# Patient Record
Sex: FEMALE | Race: WHITE | NOT HISPANIC OR LATINO | Employment: OTHER | URBAN - METROPOLITAN AREA
[De-identification: names, ages, dates, MRNs, and addresses within clinical notes are randomized per-mention and may not be internally consistent; named-entity substitution may affect disease eponyms.]

---

## 2020-12-09 ENCOUNTER — OFFICE VISIT (OUTPATIENT)
Dept: URGENT CARE | Facility: CLINIC | Age: 65
End: 2020-12-09
Payer: COMMERCIAL

## 2020-12-09 VITALS
OXYGEN SATURATION: 97 % | RESPIRATION RATE: 16 BRPM | TEMPERATURE: 98.4 F | HEART RATE: 84 BPM | HEIGHT: 63 IN | WEIGHT: 150 LBS | BODY MASS INDEX: 26.58 KG/M2

## 2020-12-09 DIAGNOSIS — Z20.822 EXPOSURE TO COVID-19 VIRUS: ICD-10-CM

## 2020-12-09 DIAGNOSIS — B34.9 VIRAL SYNDROME: Primary | ICD-10-CM

## 2020-12-09 PROCEDURE — 99203 OFFICE O/P NEW LOW 30 MIN: CPT | Performed by: PHYSICIAN ASSISTANT

## 2020-12-09 PROCEDURE — U0003 INFECTIOUS AGENT DETECTION BY NUCLEIC ACID (DNA OR RNA); SEVERE ACUTE RESPIRATORY SYNDROME CORONAVIRUS 2 (SARS-COV-2) (CORONAVIRUS DISEASE [COVID-19]), AMPLIFIED PROBE TECHNIQUE, MAKING USE OF HIGH THROUGHPUT TECHNOLOGIES AS DESCRIBED BY CMS-2020-01-R: HCPCS | Performed by: PHYSICIAN ASSISTANT

## 2020-12-09 PROCEDURE — S9083 URGENT CARE CENTER GLOBAL: HCPCS | Performed by: PHYSICIAN ASSISTANT

## 2020-12-09 RX ORDER — DICYCLOMINE HCL 20 MG
20 TABLET ORAL 2 TIMES DAILY
COMMUNITY
Start: 2020-10-26 | End: 2022-04-26

## 2020-12-09 RX ORDER — MELOXICAM 7.5 MG/1
7.5 TABLET ORAL
COMMUNITY
End: 2022-04-26

## 2020-12-09 RX ORDER — ASPIRIN 81 MG/1
81 TABLET ORAL
COMMUNITY
End: 2022-04-26

## 2020-12-09 RX ORDER — PANTOPRAZOLE SODIUM 40 MG/1
40 TABLET, DELAYED RELEASE ORAL DAILY
COMMUNITY
Start: 2020-12-03 | End: 2021-05-28 | Stop reason: SDUPTHER

## 2020-12-11 LAB — SARS-COV-2 RNA SPEC QL NAA+PROBE: NOT DETECTED

## 2021-02-03 ENCOUNTER — OFFICE VISIT (OUTPATIENT)
Dept: URGENT CARE | Facility: CLINIC | Age: 66
End: 2021-02-03
Payer: COMMERCIAL

## 2021-02-03 VITALS
TEMPERATURE: 97.5 F | OXYGEN SATURATION: 97 % | DIASTOLIC BLOOD PRESSURE: 62 MMHG | HEART RATE: 80 BPM | BODY MASS INDEX: 25.34 KG/M2 | HEIGHT: 63 IN | WEIGHT: 143 LBS | SYSTOLIC BLOOD PRESSURE: 124 MMHG | RESPIRATION RATE: 16 BRPM

## 2021-02-03 DIAGNOSIS — S61.111A LACERATION OF RIGHT THUMB WITHOUT FOREIGN BODY WITH DAMAGE TO NAIL, INITIAL ENCOUNTER: ICD-10-CM

## 2021-02-03 DIAGNOSIS — L03.011 PARONYCHIA OF RIGHT THUMB: Primary | ICD-10-CM

## 2021-02-03 PROCEDURE — S9083 URGENT CARE CENTER GLOBAL: HCPCS | Performed by: PHYSICIAN ASSISTANT

## 2021-02-03 PROCEDURE — 99213 OFFICE O/P EST LOW 20 MIN: CPT | Performed by: PHYSICIAN ASSISTANT

## 2021-02-03 RX ORDER — CEPHALEXIN 500 MG/1
500 CAPSULE ORAL EVERY 12 HOURS SCHEDULED
Qty: 14 CAPSULE | Refills: 0 | Status: SHIPPED | OUTPATIENT
Start: 2021-02-03 | End: 2021-02-10

## 2021-02-03 NOTE — PATIENT INSTRUCTIONS
Take antibiotic as prescribed  Complete full dose even if symptoms began to improve  Soak thumb and warm soapy water  Pat dry  Observe for signs of worsening infection including increased swelling, redness, discharge, red streaking up the extremity, fevers or chills  Make appointment with General surgery  Follow-up with family doctor in 3-5 days  Go to ER symptoms become severe  Paronychia   WHAT YOU NEED TO KNOW:   Paronychia is an infection of your nail fold caused by bacteria or a fungus  The nail fold is the skin around your nail  Paronychia may happen suddenly and last for 6 weeks or longer  You may have paronychia on more than 1 finger or toe  DISCHARGE INSTRUCTIONS:   Medicines:   · Td vaccine  is a booster shot used to help prevent tetanus and diphtheria  The Td booster may be given to adolescents and adults every 10 years or for certain wounds and injuries  · Antibiotics: This medicine will help fight or prevent an infection  It may be given as a pill, cream, or ointment  · Steroids: This medicine will help decrease inflammation  It may be given as a pill, cream, or ointment  · Antifungal medicine: This medicine helps kill fungus that may be causing your infection  It may be given as a cream or ointment  · NSAIDs:  These medicines decrease pain and swelling  NSAIDs are available without a doctor's order  Ask your healthcare provider which medicine is right for you  Ask how much to take and when to take it  Take as directed  NSAIDs can cause stomach bleeding and kidney problems if not taken correctly  · Take your medicine as directed  Contact your healthcare provider if you think your medicine is not helping or if you have side effects  Tell him of her if you are allergic to any medicine  Keep a list of the medicines, vitamins, and herbs you take  Include the amounts, and when and why you take them  Bring the list or the pill bottles to follow-up visits   Carry your medicine list with you in case of an emergency  Follow up with your healthcare provider as directed:  Write down your questions so you remember to ask them during your visits  Self-care:   · Soak your nail:  Soak your nail in a mixture of equal parts vinegar and water 3 or 4 times each day  This will help decrease inflammation  · Apply a warm compress:  Soak a washcloth in warm water and place it on your nail  This will help decrease inflammation  · Elevate:  Raise your nail above the level of your heart as often as you can  This will help decrease swelling and pain  Prop your nail on pillows or blankets to keep it elevated comfortably  · Use lotion:  Apply lotion after you wash your hands  This will prevent your skin from becoming too dry  Prevent paronychia:   · Avoid chemicals and allergens that may harm your skin and nails  This includes soaps, laundry detergents, and nail products  · Keep your nails clean and dry  Avoid soaking your nails in water  Use cotton-lined rubber gloves or wear 2 rubber gloves if you work with food or water  The gloves will help protect your nail folds  · Keep your nails short  Do not bite your nails, pick at your hangnails, suck your fingers, or wear fake nails  Bring your own nail tools when you go to the nail salon  Contact your healthcare provider if:   · Your nail becomes loose, deformed, or falls off  · You have a large abscess on your nail  · You have questions or concerns about your condition or care  Return to the emergency department if:   · You have severe nail pain  · The inflammation spreads to your hand or arm  © Copyright [address] Information is for End User's use only and may not be sold, redistributed or otherwise used for commercial purposes  All illustrations and images included in CareNotes® are the copyrighted property of A D A M , Inc  or [facility] Marcella Amin   The above information is an  only   It is not intended as medical advice for individual conditions or treatments  Talk to your doctor, nurse or pharmacist before following any medical regimen to see if it is safe and effective for you

## 2021-02-03 NOTE — PROGRESS NOTES
[facility]        NAME: Karlos Power is a 72 y o  female  : 1955    MRN: [id number]  DATE: February 3, 2021  TIME: 3:24 PM    Assessment and Plan   Paronychia of right thumb [H87 [id number]]  1  Paronychia of right thumb  cephalexin (KEFLEX) 500 mg capsule    Ambulatory referral to [facility]   2  Laceration of right thumb without foreign body with damage to nail, initial encounter  Ambulatory referral to General Surgery     Skin growth to right nail consistent with possible pyogenic granuloma  Will refer to general surgery for excision/ treatment  Patient Instructions    Take antibiotic as prescribed  Complete full dose even if symptoms began to improve  Soak thumb and warm soapy water  Pat dry  Observe for signs of worsening infection including increased swelling, redness, discharge, red streaking up the extremity, fevers or chills  Make appointment with General surgery  Follow up with PCP in 3-5 days  Proceed to  ER if symptoms worsen  Chief Complaint     Chief Complaint   Patient presents with    Thumb Injury     pt states 6 days ago she cut her right thumb right where the nail meets the skin  Now has a bump with puss  History of Present Illness         Patient is a 35-year-old female with no significant past medical history presents the office complaining of infection to right thumb  Patient states she actually cut her nail bed approximately 6 days ago  She reports the nail was partially split  Over last few days, she has been picking at the nail trying to take it off  She began with increasing pain, swelling, and purulent drainage  She also now has an overgrowth to the base of the nail bed  Denies fevers, chills, or streaking up the extremity  Review of Systems   Review of Systems   Skin: Positive for color change and wound           Current Medications       Current Outpatient Medications:     aspirin (ECOTRIN LOW STRENGTH) 81 mg EC tablet, Take 81 mg by mouth, Disp: , Rfl:     dicyclomine (BENTYL) 20 mg tablet, Take 20 mg by mouth 2 (two) times a day, Disp: , Rfl:     meloxicam (MOBIC) 7 5 mg tablet, Take 7 5 mg by mouth, Disp: , Rfl:     pantoprazole (PROTONIX) 40 mg tablet, , Disp: , Rfl:     cephalexin (KEFLEX) 500 mg capsule, Take 1 capsule (500 mg total) by mouth every 12 (twelve) hours for 7 days, Disp: 14 capsule, Rfl: 0    Current Allergies     Allergies as of 02/03/2021 - Reviewed 02/03/2021   Allergen Reaction Noted    Erythromycin GI Intolerance 10/26/2020            The following portions of the patient's history were reviewed and updated as appropriate: allergies, current medications, past family history, past medical history, past social history, past surgical history and problem list      Past Medical History:   Diagnosis Date    GERD (gastroesophageal reflux disease)        Past Surgical History:   Procedure Laterality Date    HYSTERECTOMY      TONSILLECTOMY         History reviewed  No pertinent family history  Medications have been verified  Objective   /62   Pulse 80   Temp 97 5 °F (36 4 °C)   Resp 16   Ht 5' 3" (1 6 m)   Wt 64 9 kg (143 lb)   SpO2 97%   BMI 25 33 kg/m²   No LMP recorded  Patient has had a hysterectomy  Physical Exam     Physical Exam  Vitals signs and nursing note reviewed  Constitutional:       Appearance: She is well-developed  HENT:      Head: Normocephalic and atraumatic  Right Ear: External ear normal       Left Ear: External ear normal       Nose: Nose normal    Eyes:      General: Lids are normal       Conjunctiva/sclera: Conjunctivae normal    Skin:     General: Skin is warm and dry  Capillary Refill: Capillary refill takes less than 2 seconds  Findings: Wound (  Right thumbParonychia with skin overgrowth consistent with possible pyogenic granuloma  purulent drainage  No streaking  Neurovascular intact ) present     Neurological:      Mental Status: She is alert               right thumb

## 2021-02-04 ENCOUNTER — TELEPHONE (OUTPATIENT)
Dept: OBGYN CLINIC | Facility: MEDICAL CENTER | Age: 66
End: 2021-02-04

## 2021-02-04 NOTE — TELEPHONE ENCOUNTER
#: [phone]      Patient called in requesting an appt for  pyogenic granuloma in the right thumb  She was seen in the urgent care  Patient wants to be seen in [facility]  Is this something Dr Yang would see?        Please advise,

## 2021-02-11 ENCOUNTER — TELEPHONE (OUTPATIENT)
Dept: OTHER | Facility: OTHER | Age: 66
End: 2021-02-11

## 2021-02-11 NOTE — TELEPHONE ENCOUNTER
C [x] 2/11/21 8:45 AM 15 Rusty Charles MD [[id number]] Rosales Ordoñez [[id number]] NP [     Please call back to reschedule

## 2021-02-16 ENCOUNTER — TELEPHONE (OUTPATIENT)
Dept: OBGYN CLINIC | Facility: OTHER | Age: 66
End: 2021-02-16

## 2021-02-16 NOTE — TELEPHONE ENCOUNTER
Patient called in with a couple questions  She is a New Patient to Dr Rosangela Tsang tomorrow 02/17    -She is wondering if there is an actual procedure tomorrow or just a consultation          C/b # [phone]

## 2021-02-16 NOTE — TELEPHONE ENCOUNTER
Attempted to contact patient back regarding tomorrows appointment  Call was forwarded to [facility] and the mail box was full so unable to leave message  Will try calling back later

## 2021-02-16 NOTE — TELEPHONE ENCOUNTER
If it is a pyogenic granuloma, we can perform a small procedure in the office if appropriate      Yvrose or Eligio Estrada, please reach out to her to confirm

## 2021-02-17 ENCOUNTER — OFFICE VISIT (OUTPATIENT)
Dept: OBGYN CLINIC | Facility: MEDICAL CENTER | Age: 66
End: 2021-02-17
Payer: COMMERCIAL

## 2021-02-17 VITALS
HEART RATE: 73 BPM | SYSTOLIC BLOOD PRESSURE: 138 MMHG | BODY MASS INDEX: 25.34 KG/M2 | HEIGHT: 63 IN | WEIGHT: 143 LBS | DIASTOLIC BLOOD PRESSURE: 77 MMHG

## 2021-02-17 DIAGNOSIS — S69.91XA INJURY TO FINGERNAIL OF RIGHT HAND, INITIAL ENCOUNTER: Primary | ICD-10-CM

## 2021-02-17 PROCEDURE — 99204 OFFICE O/P NEW MOD 45 MIN: CPT | Performed by: ORTHOPAEDIC SURGERY

## 2021-02-17 NOTE — PROGRESS NOTES
Chief Complaint     Thumb pain      History of Present Illness     Cielo Bills is a 72 y o  female who presents today for evaluation and treatment of right thumb pain  She states she cut this finger along the proximal nail fold with a knife while trying to get wax out of a candle 2-3 weeks ago  She clipped back the skin but then admits to picking at the area  She started to develop pain and and redness and was seen at urgent care  At that time, they diagnosed her with paronychia and they were concerned about a pyogenic granuloma developing overtop her proximal nail (pictures in patient's chart)  She was prescribed Keflex and states that she has finished taking this as instructed  States the mass has seemed to flatten out, but she still has occasional bleeding from this area  No purulent drainage  States this area is   Past Medical History:   Diagnosis Date    GERD (gastroesophageal reflux disease)        Past Surgical History:   Procedure Laterality Date    HYSTERECTOMY      TONSILLECTOMY         Allergies   Allergen Reactions    Erythromycin GI Intolerance       Current Outpatient Medications on File Prior to Visit   Medication Sig Dispense Refill    aspirin (ECOTRIN LOW STRENGTH) 81 mg EC tablet Take 81 mg by mouth      dicyclomine (BENTYL) 20 mg tablet Take 20 mg by mouth 2 (two) times a day      meloxicam (MOBIC) 7 5 mg tablet Take 7 5 mg by mouth      pantoprazole (PROTONIX) 40 mg tablet        No current facility-administered medications on file prior to visit  Social History     Tobacco Use    Smoking status: Never Smoker    Smokeless tobacco: Never Used   Substance Use Topics    Alcohol use: Not Currently    Drug use: Not Currently       History reviewed  No pertinent family history  Review of Systems     As stated in the HPI  All other systems were reviewed and are negative        Physical Exam     /77   Pulse 73   Ht 5' 3" (1 6 m)   Wt 64 9 kg (143 lb) BMI 25 33 kg/m²     GENERAL: This is a well-developed, well-nourished, age-appropriate patient in no acute distress  The patient is alert and oriented x3  Pleasant and cooperative  Eyes: Anicteric sclerae  Extraocular movements appear intact  HENT: Nares are patent with no drainage  Lungs: There is equal chest rise on inspection  Breathing is non-labored with no audible wheezing  Cardiovascular: No cyanosis  No upper extremity lymphadema  Skin: Skin is warm to touch  No obvious skin lesions or rashes other than described below  Neurologic: No ataxia  Psychiatric: Mood and affect are appropriate  Right Upper Extremity:  Right thumb mass on urgent care photos no longer present  Patient has circular loss of nail in the proximal 1/3 of the nail plate with underlying laceration with minimal bleeding  She has Mild erythema and edema along proximal nail fold  No drainage  EPL/FPL intact  Sensation intact to light touch both radial and ulnar sides of thumb  Brisk capillary refill  Data Review     Previous  Clinical images  from urgent care were reviewed and do show a mass located along the proximal nail that could be consistent with pyogenic granuloma  Assessment and Plan      Diagnoses and all orders for this visit:    Injury to fingernail of right hand, initial encounter       72year old female with right thumb nail fold injury and possible history of previous pyogenic granuloma  It was explained to the patient that at this time, no current evidence of a pyogenic granuloma present, but the mass that was there very well could have been this  I did explain that there is a chance of recurrence of this and to call if this occurs  Otherwise, it appears as though this is healing, but that she just has residual inflammation  I did explain this should continue to improve with time and that as her nail continues to grow out this deformity should correct itself with time   Can perform warm soapy water soaks daily and can cover with either Band-Aid or Coban wrap for protection  How to coban wrap shown to patient today  Patient should call if any changes with the finger prior to her follow up      Follow Up: 1 month    To Do Next Visit: Reevaluate wound    PROCEDURES PERFORMED:  Procedures  No Procedures performed today

## 2021-02-22 ENCOUNTER — APPOINTMENT (OUTPATIENT)
Dept: LAB | Facility: CLINIC | Age: 66
End: 2021-02-22
Payer: COMMERCIAL

## 2021-02-22 ENCOUNTER — OFFICE VISIT (OUTPATIENT)
Dept: FAMILY MEDICINE CLINIC | Facility: CLINIC | Age: 66
End: 2021-02-22
Payer: COMMERCIAL

## 2021-02-22 VITALS
BODY MASS INDEX: 25.45 KG/M2 | WEIGHT: 143.6 LBS | TEMPERATURE: 97.6 F | OXYGEN SATURATION: 98 % | SYSTOLIC BLOOD PRESSURE: 138 MMHG | HEART RATE: 74 BPM | DIASTOLIC BLOOD PRESSURE: 78 MMHG | HEIGHT: 63 IN

## 2021-02-22 DIAGNOSIS — N61.1 ABSCESS OF LEFT BREAST: ICD-10-CM

## 2021-02-22 DIAGNOSIS — L03.011 PARONYCHIA OF RIGHT THUMB: ICD-10-CM

## 2021-02-22 DIAGNOSIS — Z13.1 SCREENING FOR DIABETES MELLITUS: ICD-10-CM

## 2021-02-22 DIAGNOSIS — M54.2 NECK PAIN: ICD-10-CM

## 2021-02-22 DIAGNOSIS — R23.4 BREAST SKIN CHANGES: Primary | ICD-10-CM

## 2021-02-22 DIAGNOSIS — Z12.31 ENCOUNTER FOR SCREENING MAMMOGRAM FOR MALIGNANT NEOPLASM OF BREAST: ICD-10-CM

## 2021-02-22 DIAGNOSIS — R07.89 CHEST WALL TENDERNESS: ICD-10-CM

## 2021-02-22 DIAGNOSIS — D22.9 ATYPICAL NEVI: ICD-10-CM

## 2021-02-22 LAB
ALBUMIN SERPL BCP-MCNC: 4.2 G/DL (ref 3.5–5)
ALP SERPL-CCNC: 81 U/L (ref 46–116)
ALT SERPL W P-5'-P-CCNC: 26 U/L (ref 12–78)
ANION GAP SERPL CALCULATED.3IONS-SCNC: 3 MMOL/L (ref 4–13)
AST SERPL W P-5'-P-CCNC: 14 U/L (ref 5–45)
BILIRUB SERPL-MCNC: 0.3 MG/DL (ref 0.2–1)
BUN SERPL-MCNC: 16 MG/DL (ref 5–25)
CALCIUM SERPL-MCNC: 9.6 MG/DL (ref 8.3–10.1)
CHLORIDE SERPL-SCNC: 111 MMOL/L (ref 100–108)
CO2 SERPL-SCNC: 29 MMOL/L (ref 21–32)
CREAT SERPL-MCNC: 0.63 MG/DL (ref 0.6–1.3)
EST. AVERAGE GLUCOSE BLD GHB EST-MCNC: 108 MG/DL
GFR SERPL CREATININE-BSD FRML MDRD: 94 ML/MIN/1.73SQ M
GLUCOSE P FAST SERPL-MCNC: 96 MG/DL (ref 65–99)
HBA1C MFR BLD: 5.4 %
POTASSIUM SERPL-SCNC: 4.3 MMOL/L (ref 3.5–5.3)
PROT SERPL-MCNC: 7.2 G/DL (ref 6.4–8.2)
SODIUM SERPL-SCNC: 143 MMOL/L (ref 136–145)

## 2021-02-22 PROCEDURE — 83036 HEMOGLOBIN GLYCOSYLATED A1C: CPT

## 2021-02-22 PROCEDURE — 3725F SCREEN DEPRESSION PERFORMED: CPT | Performed by: NURSE PRACTITIONER

## 2021-02-22 PROCEDURE — 36415 COLL VENOUS BLD VENIPUNCTURE: CPT

## 2021-02-22 PROCEDURE — 3288F FALL RISK ASSESSMENT DOCD: CPT | Performed by: NURSE PRACTITIONER

## 2021-02-22 PROCEDURE — 80053 COMPREHEN METABOLIC PANEL: CPT

## 2021-02-22 PROCEDURE — 99203 OFFICE O/P NEW LOW 30 MIN: CPT | Performed by: NURSE PRACTITIONER

## 2021-02-22 PROCEDURE — 1101F PT FALLS ASSESS-DOCD LE1/YR: CPT | Performed by: NURSE PRACTITIONER

## 2021-02-22 RX ORDER — CEPHALEXIN 500 MG/1
500 CAPSULE ORAL EVERY 8 HOURS SCHEDULED
Qty: 21 CAPSULE | Refills: 0 | Status: SHIPPED | OUTPATIENT
Start: 2021-02-22 | End: 2021-03-01

## 2021-02-22 NOTE — PATIENT INSTRUCTIONS
You may receive a survey in the mail, or by e-mail, please fill it out COMPLETELY, and let us know how we did! Please remember to sign up for your [employer] fransisco to check you lab results, send us messages, and schedule appointments  If you have questions about the [employer] option, please call us! Thank you again for choosing [facility]!

## 2021-02-22 NOTE — PROGRESS NOTES
BMI Counseling: Body mass index is 25 44 kg/m²  The BMI is above normal  Nutrition recommendations include encouraging healthy choices of fruits and vegetables, moderation in carbohydrate intake and reducing intake of saturated and trans fat  Reports she has been dieting more often  Assessment/Plan:       Diagnoses and all orders for this visit:    Encounter for screening mammogram for malignant neoplasm of breast  -     cephalexin (KEFLEX) 500 mg capsule; Take 1 capsule (500 mg total) by mouth every 8 (eight) hours for 7 days  -     Mammo diagnostic bilateral w cad; Future    Breast skin changes  -     cephalexin (KEFLEX) 500 mg capsule; Take 1 capsule (500 mg total) by mouth every 8 (eight) hours for 7 days  -     Mammo diagnostic bilateral w cad; Future    Abscess of left breast  -     cephalexin (KEFLEX) 500 mg capsule; Take 1 capsule (500 mg total) by mouth every 8 (eight) hours for 7 days  -     Mammo diagnostic bilateral w cad; Future    Neck pain    Chest wall tenderness    Atypical nevi  -     cephalexin (KEFLEX) 500 mg capsule; Take 1 capsule (500 mg total) by mouth every 8 (eight) hours for 7 days  -     Mammo diagnostic bilateral w cad; Future    Screening for diabetes mellitus  -     HEMOGLOBIN A1C W/ EAG ESTIMATION; Future  -     Comprehensive metabolic panel; Future     The dark area on her right areola appears to be a mole but she notes it is new and the area under the left breasts does appear to be a sore which is also new  She is due for a mammogram so will have that completed  Also due to appearance of the area of concern of the left breast will begin a course of abx therapy  Suspect the neck and back pain to be muscular in nature - she would like a breasts reduction in the future and the seems plausible  Screening blood work also ordered  Subjective:      Patient ID: Ramirez Wong is a 72 y o  female      Here today as a new patient - reports recent areas of concern on her breasts  States of a small "black spot" on her right breast that is new and a red/sore area on the underside of her left breast   Notes that her breasts cause her many back issues and neck pain due to their larger size  She attempted a breast reduction in the past and was denied  Reports of some stomach issues in which she was told this past summer in [place] that she has a stomach condition but does not remember the name  Also states of some tenderness to her chest wall - notes it hurts when she presses on it  The following portions of the patient's history were reviewed and updated as appropriate: allergies, current medications, past family history, past medical history, past social history, past surgical history and problem list     Review of Systems   Constitutional: Negative  Respiratory: Negative  Cardiovascular: Negative  Skin:        Please see HPI  Neurological: Negative  Objective:      /78 (BP Location: Right arm, Patient Position: Sitting, Cuff Size: Large)   Pulse 74   Temp 97 6 °F (36 4 °C)   Ht 5' 3" (1 6 m)   Wt 65 1 kg (143 lb 9 6 oz)   SpO2 98%   BMI 25 44 kg/m²          Physical Exam  Constitutional:       Appearance: Normal appearance  Cardiovascular:      Rate and Rhythm: Normal rate and regular rhythm  Heart sounds: No murmur  No gallop  Pulmonary:      Effort: Pulmonary effort is normal  No respiratory distress  Breath sounds: Normal breath sounds  No wheezing or rales  Chest:       Neurological:      General: No focal deficit present  Mental Status: She is alert and oriented to person, place, and time  Mental status is at baseline  Psychiatric:         Mood and Affect: Mood normal          Behavior: Behavior normal          Thought Content:  Thought content normal          Judgment: Judgment normal

## 2021-02-24 ENCOUNTER — HOSPITAL ENCOUNTER (OUTPATIENT)
Dept: RADIOLOGY | Facility: HOSPITAL | Age: 66
Discharge: HOME | End: 2021-02-24
Attending: RADIOLOGY

## 2021-02-24 ENCOUNTER — HOSPITAL ENCOUNTER (OUTPATIENT)
Dept: RADIOLOGY | Facility: CLINIC | Age: 66
Discharge: HOME | End: 2021-02-24
Payer: COMMERCIAL

## 2021-02-24 ENCOUNTER — TELEPHONE (OUTPATIENT)
Dept: FAMILY MEDICINE CLINIC | Facility: CLINIC | Age: 66
End: 2021-02-24

## 2021-02-24 VITALS — BODY MASS INDEX: 25.34 KG/M2 | WEIGHT: 143 LBS | HEIGHT: 63 IN

## 2021-02-24 DIAGNOSIS — D22.9 ATYPICAL NEVI: ICD-10-CM

## 2021-02-24 DIAGNOSIS — R23.4 BREAST SKIN CHANGES: ICD-10-CM

## 2021-02-24 DIAGNOSIS — R23.4 CHANGE OF SKIN OF BREAST: Primary | ICD-10-CM

## 2021-02-24 DIAGNOSIS — R23.4 CHANGE OF SKIN OF BREAST: ICD-10-CM

## 2021-02-24 DIAGNOSIS — Z76.89 REFERRAL OF PATIENT WITHOUT EXAMINATION OR TREATMENT: ICD-10-CM

## 2021-02-24 DIAGNOSIS — N61.1 ABSCESS OF LEFT BREAST: ICD-10-CM

## 2021-02-24 DIAGNOSIS — N61.1: ICD-10-CM

## 2021-02-24 PROCEDURE — G0279 TOMOSYNTHESIS, MAMMO: HCPCS

## 2021-02-24 PROCEDURE — 77066 DX MAMMO INCL CAD BI: CPT

## 2021-02-24 PROCEDURE — 76642 ULTRASOUND BREAST LIMITED: CPT

## 2021-03-23 ENCOUNTER — OFFICE VISIT (OUTPATIENT)
Dept: FAMILY MEDICINE CLINIC | Facility: CLINIC | Age: 66
End: 2021-03-23
Payer: COMMERCIAL

## 2021-03-23 VITALS
HEIGHT: 63 IN | OXYGEN SATURATION: 98 % | TEMPERATURE: 97.8 F | WEIGHT: 144.4 LBS | HEART RATE: 53 BPM | SYSTOLIC BLOOD PRESSURE: 142 MMHG | BODY MASS INDEX: 25.59 KG/M2 | DIASTOLIC BLOOD PRESSURE: 90 MMHG

## 2021-03-23 DIAGNOSIS — R22.1 NECK SWELLING: ICD-10-CM

## 2021-03-23 DIAGNOSIS — H92.01 RIGHT EAR PAIN: ICD-10-CM

## 2021-03-23 DIAGNOSIS — K08.89 PAIN, DENTAL: Primary | ICD-10-CM

## 2021-03-23 DIAGNOSIS — R14.0 ABDOMINAL BLOATING: ICD-10-CM

## 2021-03-23 PROCEDURE — 1160F RVW MEDS BY RX/DR IN RCRD: CPT | Performed by: NURSE PRACTITIONER

## 2021-03-23 PROCEDURE — 1036F TOBACCO NON-USER: CPT | Performed by: NURSE PRACTITIONER

## 2021-03-23 PROCEDURE — 3008F BODY MASS INDEX DOCD: CPT | Performed by: NURSE PRACTITIONER

## 2021-03-23 PROCEDURE — 99213 OFFICE O/P EST LOW 20 MIN: CPT | Performed by: NURSE PRACTITIONER

## 2021-03-23 RX ORDER — AMOXICILLIN 500 MG/1
500 CAPSULE ORAL EVERY 12 HOURS SCHEDULED
Qty: 14 CAPSULE | Refills: 0 | Status: SHIPPED | OUTPATIENT
Start: 2021-03-23 | End: 2021-03-30

## 2021-03-23 NOTE — PATIENT INSTRUCTIONS
You may receive a survey in the mail, or by e-mail, please fill it out COMPLETELY, and let us know how we did! Please remember to sign up for your [employer] fr[employer]isco to check you lab results, send us messages, and schedule appointments  If you have questions about the [employer] option, please call us! Thank you again for choosing [facility]!

## 2021-03-23 NOTE — PROGRESS NOTES
Assessment/Plan:       Diagnoses and all orders for this visit:    Pain, dental  -     amoxicillin (AMOXIL) 500 mg capsule; Take 1 capsule (500 mg total) by mouth every 12 (twelve) hours for 7 days    Neck swelling  -     amoxicillin (AMOXIL) 500 mg capsule; Take 1 capsule (500 mg total) by mouth every 12 (twelve) hours for 7 days    Right ear pain  -     amoxicillin (AMOXIL) 500 mg capsule; Take 1 capsule (500 mg total) by mouth every 12 (twelve) hours for 7 days    Abdominal bloating  -     US abdomen complete; Future      antibiotic prescribed for right dental pain  After discussion, will have an abdominal US completed due to abdominal bloating  Subjective:      Patient ID: Cielo Bills is a 72 y o  female  Here today with complaint of right sided dental pain and some right sided neck swelling and right ear pain  Reports her dentist would like her to see her PCP for an antibiotic  Also notes of feeling more bloated in her abdomen recently and does not know why  Denies any constipation  Also feels that maybe her neck pain that she has may be related to her teeth  The following portions of the patient's history were reviewed and updated as appropriate: allergies, current medications, past family history, past medical history, past social history, past surgical history and problem list     Review of Systems   Constitutional: Negative  Respiratory: Negative  Cardiovascular: Negative  Neurological: Negative  All other systems reviewed and are negative  Objective:      /90 (BP Location: Right arm, Patient Position: Sitting, Cuff Size: Standard)   Pulse (!) 53   Temp 97 8 °F (36 6 °C)   Ht 5' 3" (1 6 m)   Wt 65 5 kg (144 lb 6 4 oz)   SpO2 98%   BMI 25 58 kg/m²          Physical Exam  Constitutional:       Appearance: Normal appearance     HENT:      Right Ear: Hearing, tympanic membrane, ear canal and external ear normal    Abdominal:      General: There is no distension  Neurological:      Mental Status: She is alert and oriented to person, place, and time

## 2021-03-25 ENCOUNTER — IMMUNIZATIONS (OUTPATIENT)
Dept: FAMILY MEDICINE CLINIC | Facility: HOSPITAL | Age: 66
End: 2021-03-25

## 2021-03-25 DIAGNOSIS — Z23 ENCOUNTER FOR IMMUNIZATION: Primary | ICD-10-CM

## 2021-03-25 PROCEDURE — 91301 SARS-COV-2 / COVID-19 MRNA VACCINE (MODERNA) 100 MCG: CPT

## 2021-03-25 PROCEDURE — 0011A SARS-COV-2 / COVID-19 MRNA VACCINE (MODERNA) 100 MCG: CPT

## 2021-03-26 ENCOUNTER — HOSPITAL ENCOUNTER (OUTPATIENT)
Dept: ULTRASOUND IMAGING | Facility: HOSPITAL | Age: 66
Discharge: HOME | End: 2021-03-26
Payer: COMMERCIAL

## 2021-03-26 DIAGNOSIS — R14.0 ABDOMINAL BLOATING: ICD-10-CM

## 2021-03-26 PROCEDURE — 76700 US EXAM ABDOM COMPLETE: CPT

## 2021-03-31 DIAGNOSIS — R14.0 ABDOMINAL BLOATING: Primary | ICD-10-CM

## 2021-04-01 ENCOUNTER — APPOINTMENT (OUTPATIENT)
Dept: LAB | Facility: CLINIC | Age: 66
End: 2021-04-01
Payer: COMMERCIAL

## 2021-04-01 DIAGNOSIS — R14.0 ABDOMINAL BLOATING: ICD-10-CM

## 2021-04-01 PROCEDURE — 82785 ASSAY OF IGE: CPT

## 2021-04-01 PROCEDURE — 86003 ALLG SPEC IGE CRUDE XTRC EA: CPT

## 2021-04-02 LAB
ALLERGEN COMMENT: NORMAL
ALMOND IGE QN: <0.1 KUA/I
CASHEW NUT IGE QN: <0.1 KUA/I
CODFISH IGE QN: <0.1 KUA/I
EGG WHITE IGE QN: <0.1 KUA/I
GLUTEN IGE QN: <0.1 KUA/I
HAZELNUT IGE QN: <0.1 KUA/L
MILK IGE QN: <0.1 KUA/I
PEANUT IGE QN: <0.1 KUA/I
SALMON IGE QN: <0.1 KUA/I
SCALLOP IGE QN: <0.1 KUA/L
SESAME SEED IGE QN: <0.1 KUA/I
SHRIMP IGE QN: <0.1 KUA/L
SOYBEAN IGE QN: <0.1 KUA/I
TOTAL IGE SMQN RAST: <2 KU/L (ref 0–113)
TUNA IGE QN: <0.1 KUA/I
WALNUT IGE QN: <0.1 KUA/I
WHEAT IGE QN: <0.1 KUA/I

## 2021-04-13 ENCOUNTER — TELEPHONE (OUTPATIENT)
Dept: FAMILY MEDICINE CLINIC | Facility: CLINIC | Age: 66
End: 2021-04-13

## 2021-04-13 NOTE — TELEPHONE ENCOUNTER
Patient called and stated she is still having stomach issues  She is going away in a couple weeks and when she had gone to [facility] in October and the doctor told her she had SIBO bacterial growth  Wants to know if there is any specific diet that she should be on, she does not want to get sick while on vacation      Please advise

## 2021-04-14 NOTE — TELEPHONE ENCOUNTER
Well, with that she should avoid food that can cause fermentation leading to possible bacteria  They recommend a diet low in FODMAPs which are types of food that can convert easier to fermentation  She should avoid excess sugar, lactose, soda/soft drinks  I would recommend she look up FODMAPs herself, as the list is quite long prior, prior to going on vacatin

## 2021-04-23 ENCOUNTER — IMMUNIZATIONS (OUTPATIENT)
Dept: FAMILY MEDICINE CLINIC | Facility: HOSPITAL | Age: 66
End: 2021-04-23

## 2021-04-23 DIAGNOSIS — Z23 ENCOUNTER FOR IMMUNIZATION: Primary | ICD-10-CM

## 2021-04-23 PROCEDURE — 91301 SARS-COV-2 / COVID-19 MRNA VACCINE (MODERNA) 100 MCG: CPT

## 2021-04-23 PROCEDURE — 0012A SARS-COV-2 / COVID-19 MRNA VACCINE (MODERNA) 100 MCG: CPT

## 2021-04-24 ENCOUNTER — APPOINTMENT (EMERGENCY)
Dept: RADIOLOGY | Facility: HOSPITAL | Age: 66
End: 2021-04-24
Payer: COMMERCIAL

## 2021-04-24 ENCOUNTER — APPOINTMENT (EMERGENCY)
Dept: CT IMAGING | Facility: HOSPITAL | Age: 66
End: 2021-04-24
Payer: COMMERCIAL

## 2021-04-24 ENCOUNTER — HOSPITAL ENCOUNTER (EMERGENCY)
Facility: HOSPITAL | Age: 66
Discharge: HOME | End: 2021-04-24
Attending: EMERGENCY MEDICINE | Admitting: EMERGENCY MEDICINE
Payer: COMMERCIAL

## 2021-04-24 VITALS
SYSTOLIC BLOOD PRESSURE: 108 MMHG | HEART RATE: 79 BPM | WEIGHT: 144 LBS | TEMPERATURE: 98.7 F | HEIGHT: 63 IN | BODY MASS INDEX: 25.52 KG/M2 | OXYGEN SATURATION: 98 % | DIASTOLIC BLOOD PRESSURE: 78 MMHG | RESPIRATION RATE: 18 BRPM
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
## 2021-04-24 DIAGNOSIS — S00.83XA CONTUSION OF FACE, INITIAL ENCOUNTER: ICD-10-CM

## 2021-04-24 DIAGNOSIS — M79.602 PAIN OF LEFT UPPER EXTREMITY: ICD-10-CM

## 2021-04-24 DIAGNOSIS — K04.7 DENTAL INFECTION: Primary | ICD-10-CM

## 2021-04-24 LAB
ALBUMIN SERPL BCP-MCNC: 3.8 G/DL (ref 3.5–5)
ALP SERPL-CCNC: 95 U/L (ref 46–116)
ALT SERPL W P-5'-P-CCNC: 34 U/L (ref 12–78)
AMPHETAMINES SERPL QL SCN: NEGATIVE
ANION GAP SERPL CALCULATED.3IONS-SCNC: 8 MMOL/L (ref 4–13)
AST SERPL W P-5'-P-CCNC: 29 U/L (ref 5–45)
BACTERIA UR QL AUTO: NORMAL /HPF
BARBITURATES UR QL: NEGATIVE
BASOPHILS # BLD AUTO: 0.03 THOUSANDS/ΜL (ref 0–0.1)
BASOPHILS NFR BLD AUTO: 1 % (ref 0–1)
BENZODIAZ UR QL: NEGATIVE
BILIRUB SERPL-MCNC: 0.38 MG/DL (ref 0.2–1)
BILIRUB UR QL STRIP: NEGATIVE
BUN SERPL-MCNC: 17 MG/DL (ref 5–25)
CALCIUM SERPL-MCNC: 8.1 MG/DL (ref 8.3–10.1)
CHLORIDE SERPL-SCNC: 103 MMOL/L (ref 100–108)
CLARITY UR: CLEAR
CO2 SERPL-SCNC: 27 MMOL/L (ref 21–32)
COCAINE UR QL: NEGATIVE
COLOR UR: YELLOW
CREAT SERPL-MCNC: 0.95 MG/DL (ref 0.6–1.3)
EOSINOPHIL # BLD AUTO: 0.13 THOUSAND/ΜL (ref 0–0.61)
EOSINOPHIL NFR BLD AUTO: 2 % (ref 0–6)
ERYTHROCYTE [DISTWIDTH] IN BLOOD BY AUTOMATED COUNT: 12.9 % (ref 11.6–15.1)
ETHANOL SERPL-MCNC: <3 MG/DL (ref 0–3)
GFR SERPL CREATININE-BSD FRML MDRD: 63 ML/MIN/1.73SQ M
GLUCOSE SERPL-MCNC: 118 MG/DL (ref 65–140)
GLUCOSE UR STRIP-MCNC: NEGATIVE MG/DL
HCT VFR BLD AUTO: 36.2 % (ref 34.8–46.1)
HGB BLD-MCNC: 12.4 G/DL (ref 11.5–15.4)
HGB UR QL STRIP.AUTO: ABNORMAL
IMM GRANULOCYTES # BLD AUTO: 0.01 THOUSAND/UL (ref 0–0.2)
IMM GRANULOCYTES NFR BLD AUTO: 0 % (ref 0–2)
KETONES UR STRIP-MCNC: NEGATIVE MG/DL
LACTATE SERPL-SCNC: 0.8 MMOL/L (ref 0.5–2)
LEUKOCYTE ESTERASE UR QL STRIP: NEGATIVE
LYMPHOCYTES # BLD AUTO: 0.99 THOUSANDS/ΜL (ref 0.6–4.47)
LYMPHOCYTES NFR BLD AUTO: 17 % (ref 14–44)
MCH RBC QN AUTO: 29.3 PG (ref 26.8–34.3)
MCHC RBC AUTO-ENTMCNC: 34.3 G/DL (ref 31.4–37.4)
MCV RBC AUTO: 86 FL (ref 82–98)
METHADONE UR QL: NEGATIVE
MONOCYTES # BLD AUTO: 0.45 THOUSAND/ΜL (ref 0.17–1.22)
MONOCYTES NFR BLD AUTO: 8 % (ref 4–12)
NEUTROPHILS # BLD AUTO: 4.36 THOUSANDS/ΜL (ref 1.85–7.62)
NEUTS SEG NFR BLD AUTO: 72 % (ref 43–75)
NITRITE UR QL STRIP: NEGATIVE
NON-SQ EPI CELLS URNS QL MICRO: NORMAL /HPF
NRBC BLD AUTO-RTO: 0 /100 WBCS
OPIATES UR QL SCN: POSITIVE
OXYCODONE+OXYMORPHONE UR QL SCN: NEGATIVE
PCP UR QL: NEGATIVE
PH UR STRIP.AUTO: 6 [PH]
PLATELET # BLD AUTO: 203 THOUSANDS/UL (ref 149–390)
PMV BLD AUTO: 10.3 FL (ref 8.9–12.7)
POTASSIUM SERPL-SCNC: 4.1 MMOL/L (ref 3.5–5.3)
PROT SERPL-MCNC: 6.8 G/DL (ref 6.4–8.2)
PROT UR STRIP-MCNC: NEGATIVE MG/DL
RBC # BLD AUTO: 4.23 MILLION/UL (ref 3.81–5.12)
RBC #/AREA URNS AUTO: NORMAL /HPF
SARS-COV-2 RNA RESP QL NAA+PROBE: NEGATIVE
SODIUM SERPL-SCNC: 138 MMOL/L (ref 136–145)
SP GR UR STRIP.AUTO: 1.01 (ref 1–1.03)
THC UR QL: NEGATIVE
TROPONIN I SERPL-MCNC: <0.02 NG/ML
UROBILINOGEN UR QL STRIP.AUTO: 0.2 E.U./DL
WBC # BLD AUTO: 5.97 THOUSAND/UL (ref 4.31–10.16)
WBC #/AREA URNS AUTO: NORMAL /HPF

## 2021-04-24 PROCEDURE — 70450 CT HEAD/BRAIN W/O DYE: CPT

## 2021-04-24 PROCEDURE — 83605 ASSAY OF LACTIC ACID: CPT | Performed by: PHYSICIAN ASSISTANT

## 2021-04-24 PROCEDURE — U0005 INFEC AGEN DETEC AMPLI PROBE: HCPCS | Performed by: PHYSICIAN ASSISTANT

## 2021-04-24 PROCEDURE — 36415 COLL VENOUS BLD VENIPUNCTURE: CPT | Performed by: PHYSICIAN ASSISTANT

## 2021-04-24 PROCEDURE — 80307 DRUG TEST PRSMV CHEM ANLYZR: CPT | Performed by: PHYSICIAN ASSISTANT

## 2021-04-24 PROCEDURE — 99285 EMERGENCY DEPT VISIT HI MDM: CPT | Performed by: EMERGENCY MEDICINE

## 2021-04-24 PROCEDURE — U0003 INFECTIOUS AGENT DETECTION BY NUCLEIC ACID (DNA OR RNA); SEVERE ACUTE RESPIRATORY SYNDROME CORONAVIRUS 2 (SARS-COV-2) (CORONAVIRUS DISEASE [COVID-19]), AMPLIFIED PROBE TECHNIQUE, MAKING USE OF HIGH THROUGHPUT TECHNOLOGIES AS DESCRIBED BY CMS-2020-01-R: HCPCS | Performed by: PHYSICIAN ASSISTANT

## 2021-04-24 PROCEDURE — 93005 ELECTROCARDIOGRAM TRACING: CPT

## 2021-04-24 PROCEDURE — 99285 EMERGENCY DEPT VISIT HI MDM: CPT

## 2021-04-24 PROCEDURE — 71045 X-RAY EXAM CHEST 1 VIEW: CPT

## 2021-04-24 PROCEDURE — 70486 CT MAXILLOFACIAL W/O DYE: CPT

## 2021-04-24 PROCEDURE — 72170 X-RAY EXAM OF PELVIS: CPT

## 2021-04-24 PROCEDURE — 80053 COMPREHEN METABOLIC PANEL: CPT | Performed by: PHYSICIAN ASSISTANT

## 2021-04-24 PROCEDURE — 73060 X-RAY EXAM OF HUMERUS: CPT

## 2021-04-24 PROCEDURE — 82077 ASSAY SPEC XCP UR&BREATH IA: CPT | Performed by: PHYSICIAN ASSISTANT

## 2021-04-24 PROCEDURE — 72125 CT NECK SPINE W/O DYE: CPT

## 2021-04-24 PROCEDURE — 96360 HYDRATION IV INFUSION INIT: CPT

## 2021-04-24 PROCEDURE — 84484 ASSAY OF TROPONIN QUANT: CPT | Performed by: PHYSICIAN ASSISTANT

## 2021-04-24 PROCEDURE — 73080 X-RAY EXAM OF ELBOW: CPT

## 2021-04-24 PROCEDURE — 85025 COMPLETE CBC W/AUTO DIFF WBC: CPT | Performed by: PHYSICIAN ASSISTANT

## 2021-04-24 RX ORDER — CLINDAMYCIN HYDROCHLORIDE 300 MG/1
300 CAPSULE ORAL 4 TIMES DAILY
Qty: 28 CAPSULE | Refills: 0 | Status: SHIPPED | OUTPATIENT
Start: 2021-04-24 | End: 2021-05-01

## 2021-04-24 RX ORDER — ACETAMINOPHEN 325 MG/1
650 TABLET ORAL ONCE
Status: COMPLETED | OUTPATIENT
Start: 2021-04-24 | End: 2021-04-24

## 2021-04-24 RX ORDER — CLINDAMYCIN HYDROCHLORIDE 150 MG/1
300 CAPSULE ORAL ONCE
Status: COMPLETED | OUTPATIENT
Start: 2021-04-24 | End: 2021-04-24

## 2021-04-24 RX ORDER — CLINDAMYCIN PHOSPHATE 600 MG/50ML
600 INJECTION INTRAVENOUS ONCE
Status: DISCONTINUED | OUTPATIENT
Start: 2021-04-24 | End: 2021-04-24

## 2021-04-24 RX ADMIN — SODIUM CHLORIDE 1000 ML: 0.9 INJECTION, SOLUTION INTRAVENOUS at 16:34

## 2021-04-24 RX ADMIN — ACETAMINOPHEN 650 MG: 325 TABLET ORAL at 16:41

## 2021-04-24 RX ADMIN — CLINDAMYCIN HYDROCHLORIDE 300 MG: 150 CAPSULE ORAL at 17:30

## 2021-04-24 NOTE — DISCHARGE INSTRUCTIONS
Please stop taking the amoxicillin and start taking the clindamycin as directed please follow-up with your dentist

## 2021-04-24 NOTE — ED ATTENDING ATTESTATION
4/24/2021  IKenna MD, saw and evaluated the patient  I have discussed the patient with the resident/non-physician practitioner and agree with the resident's/non-physician practitioner's findings, Plan of Care, and MDM as documented in the resident's/non-physician practitioner's note, except where noted  All available labs and Radiology studies were reviewed  I was present for key portions of any procedure(s) performed by the resident/non-physician practitioner and I was immediately available to provide assistance  At this point I agree with the current assessment done in the Emergency Department        ED Course         Critical Care Time  Procedures

## 2021-04-25 LAB
ATRIAL RATE: 82 BPM
P AXIS: 56 DEGREES
PR INTERVAL: 156 MS
QRS AXIS: 23 DEGREES
QRSD INTERVAL: 76 MS
QT INTERVAL: 346 MS
QTC INTERVAL: 404 MS
T WAVE AXIS: 38 DEGREES
VENTRICULAR RATE: 82 BPM

## 2021-05-21 ENCOUNTER — RA CDI HCC (OUTPATIENT)
Dept: OTHER | Facility: HOSPITAL | Age: 66
End: 2021-05-21

## 2021-05-28 ENCOUNTER — TRANSCRIBE ORDERS (OUTPATIENT)
Dept: ADMINISTRATIVE | Facility: HOSPITAL | Age: 66
End: 2021-05-28

## 2021-05-28 ENCOUNTER — APPOINTMENT (OUTPATIENT)
Dept: RADIOLOGY | Facility: CLINIC | Age: 66
End: 2021-05-28
Payer: COMMERCIAL

## 2021-05-28 ENCOUNTER — OFFICE VISIT (OUTPATIENT)
Dept: FAMILY MEDICINE CLINIC | Facility: CLINIC | Age: 66
End: 2021-05-28
Payer: COMMERCIAL

## 2021-05-28 VITALS
SYSTOLIC BLOOD PRESSURE: 130 MMHG | HEIGHT: 63 IN | OXYGEN SATURATION: 98 % | DIASTOLIC BLOOD PRESSURE: 78 MMHG | BODY MASS INDEX: 25.16 KG/M2 | TEMPERATURE: 98 F | HEART RATE: 63 BPM | WEIGHT: 142 LBS

## 2021-05-28 DIAGNOSIS — M79.604 PAIN IN BOTH LOWER EXTREMITIES: ICD-10-CM

## 2021-05-28 DIAGNOSIS — K21.9 GASTROESOPHAGEAL REFLUX DISEASE WITHOUT ESOPHAGITIS: ICD-10-CM

## 2021-05-28 DIAGNOSIS — M54.2 NECK PAIN, BILATERAL: ICD-10-CM

## 2021-05-28 DIAGNOSIS — R10.30 LOWER ABDOMINAL PAIN: ICD-10-CM

## 2021-05-28 DIAGNOSIS — M79.605 PAIN IN BOTH LOWER EXTREMITIES: ICD-10-CM

## 2021-05-28 DIAGNOSIS — Z00.00 WELCOME TO MEDICARE PREVENTIVE VISIT: Primary | ICD-10-CM

## 2021-05-28 DIAGNOSIS — R42 DIZZINESS: ICD-10-CM

## 2021-05-28 LAB
BACTERIA UR QL AUTO: NORMAL /HPF
BILIRUB UR QL STRIP: NEGATIVE
CLARITY UR: CLEAR
COLOR UR: YELLOW
GLUCOSE UR STRIP-MCNC: NEGATIVE MG/DL
HGB UR QL STRIP.AUTO: NEGATIVE
HYALINE CASTS #/AREA URNS LPF: NORMAL /LPF
KETONES UR STRIP-MCNC: NEGATIVE MG/DL
LEUKOCYTE ESTERASE UR QL STRIP: NEGATIVE
NITRITE UR QL STRIP: NEGATIVE
NON-SQ EPI CELLS URNS QL MICRO: NORMAL /HPF
PH UR STRIP.AUTO: 6.5 [PH]
PROT UR STRIP-MCNC: NEGATIVE MG/DL
RBC #/AREA URNS AUTO: NORMAL /HPF
SL AMB  POCT GLUCOSE, UA: ABNORMAL
SL AMB LEUKOCYTE ESTERASE,UA: ABNORMAL
SL AMB POCT BILIRUBIN,UA: ABNORMAL
SL AMB POCT BLOOD,UA: ABNORMAL
SL AMB POCT CLARITY,UA: CLEAR
SL AMB POCT COLOR,UA: YELLOW
SL AMB POCT KETONES,UA: ABNORMAL
SL AMB POCT NITRITE,UA: ABNORMAL
SL AMB POCT PH,UA: 5
SL AMB POCT SPECIFIC GRAVITY,UA: 1.01
SL AMB POCT URINE PROTEIN: ABNORMAL
SL AMB POCT UROBILINOGEN: ABNORMAL
SP GR UR STRIP.AUTO: 1.01 (ref 1–1.03)
UROBILINOGEN UR QL STRIP.AUTO: 0.2 E.U./DL
WBC #/AREA URNS AUTO: NORMAL /HPF

## 2021-05-28 PROCEDURE — 3288F FALL RISK ASSESSMENT DOCD: CPT | Performed by: NURSE PRACTITIONER

## 2021-05-28 PROCEDURE — 3725F SCREEN DEPRESSION PERFORMED: CPT | Performed by: NURSE PRACTITIONER

## 2021-05-28 PROCEDURE — G0438 PPPS, INITIAL VISIT: HCPCS | Performed by: NURSE PRACTITIONER

## 2021-05-28 PROCEDURE — 1160F RVW MEDS BY RX/DR IN RCRD: CPT | Performed by: NURSE PRACTITIONER

## 2021-05-28 PROCEDURE — 72050 X-RAY EXAM NECK SPINE 4/5VWS: CPT

## 2021-05-28 PROCEDURE — 93000 ELECTROCARDIOGRAM COMPLETE: CPT | Performed by: NURSE PRACTITIONER

## 2021-05-28 PROCEDURE — 81001 URINALYSIS AUTO W/SCOPE: CPT | Performed by: NURSE PRACTITIONER

## 2021-05-28 PROCEDURE — 87086 URINE CULTURE/COLONY COUNT: CPT | Performed by: NURSE PRACTITIONER

## 2021-05-28 PROCEDURE — 99213 OFFICE O/P EST LOW 20 MIN: CPT | Performed by: NURSE PRACTITIONER

## 2021-05-28 PROCEDURE — 1036F TOBACCO NON-USER: CPT | Performed by: NURSE PRACTITIONER

## 2021-05-28 PROCEDURE — 1125F AMNT PAIN NOTED PAIN PRSNT: CPT | Performed by: NURSE PRACTITIONER

## 2021-05-28 PROCEDURE — 81002 URINALYSIS NONAUTO W/O SCOPE: CPT | Performed by: NURSE PRACTITIONER

## 2021-05-28 PROCEDURE — 3008F BODY MASS INDEX DOCD: CPT | Performed by: NURSE PRACTITIONER

## 2021-05-28 PROCEDURE — 1170F FXNL STATUS ASSESSED: CPT | Performed by: NURSE PRACTITIONER

## 2021-05-28 RX ORDER — PANTOPRAZOLE SODIUM 40 MG/1
40 TABLET, DELAYED RELEASE ORAL DAILY
Qty: 90 TABLET | Refills: 0 | Status: SHIPPED | OUTPATIENT
Start: 2021-05-28 | End: 2021-08-16

## 2021-05-28 RX ORDER — NITROFURANTOIN 25; 75 MG/1; MG/1
100 CAPSULE ORAL 2 TIMES DAILY
Qty: 10 CAPSULE | Refills: 0 | Status: SHIPPED | OUTPATIENT
Start: 2021-05-28 | End: 2021-06-02

## 2021-05-28 NOTE — PATIENT INSTRUCTIONS
Medicare Preventive Visit Patient Instructions  Thank you for completing your Welcome to Medicare Visit or Medicare Annual Wellness Visit today  Your next wellness visit will be due in one year (5/29/2022)  The screening/preventive services that you may require over the next 5-10 years are detailed below  Some tests may not apply to you based off risk factors and/or age  Screening tests ordered at today's visit but not completed yet may show as past due  Also, please note that scanned in results may not display below  Preventive Screenings:  Service Recommendations Previous Testing/Comments   Colorectal Cancer Screening  * Colonoscopy    * Fecal Occult Blood Test (FOBT)/Fecal Immunochemical Test (FIT)  * Fecal DNA/Cologuard Test  * Flexible Sigmoidoscopy Age: 54-65 years old   Colonoscopy: every 10 years (may be performed more frequently if at higher risk)  OR  FOBT/FIT: every 1 year  OR  Cologuard: every 3 years  OR  Sigmoidoscopy: every 5 years  Screening may be recommended earlier than age 48 if at higher risk for colorectal cancer  Also, an individualized decision between you and your healthcare provider will decide whether screening between the ages of 74-80 would be appropriate  Colonoscopy: 03/12/2020  FOBT/FIT: Not on file  Cologuard: Not on file  Sigmoidoscopy: Not on file    Screening Current     Breast Cancer Screening Age: 36 years old  Frequency: every 1-2 years  Not required if history of left and right mastectomy Mammogram: 02/24/2021    Screening Current   Cervical Cancer Screening Between the ages of 21-29, pap smear recommended once every 3 years  Between the ages of 33-67, can perform pap smear with HPV co-testing every 5 years     Recommendations may differ for women with a history of total hysterectomy, cervical cancer, or abnormal pap smears in past  Pap Smear: Not on file    Screening Not Indicated   Hepatitis C Screening Once for adults born between 1945 and 1965  More frequently in patients at high risk for Hepatitis C Hep C Antibody: Not on file        Diabetes Screening 1-2 times per year if you're at risk for diabetes or have pre-diabetes Fasting glucose: 96 mg/dL   A1C: 5 4 %    Screening Current   Cholesterol Screening Once every 5 years if you don't have a lipid disorder  May order more often based on risk factors  Lipid panel: 06/24/2019    Screening Current     Other Preventive Screenings Covered by Medicare:  1  Abdominal Aortic Aneurysm (AAA) Screening: covered once if your at risk  You're considered to be at risk if you have a family history of AAA  2  Lung Cancer Screening: covers low dose CT scan once per year if you meet all of the following conditions: (1) Age 50-69; (2) No signs or symptoms of lung cancer; (3) Current smoker or have quit smoking within the last 15 years; (4) You have a tobacco smoking history of at least 30 pack years (packs per day multiplied by number of years you smoked); (5) You get a written order from a healthcare provider  3  Glaucoma Screening: covered annually if you're considered high risk: (1) You have diabetes OR (2) Family history of glaucoma OR (3)  aged 48 and older OR (3)  American aged 72 and older  3  Osteoporosis Screening: covered every 2 years if you meet one of the following conditions: (1) You're estrogen deficient and at risk for osteoporosis based off medical history and other findings; (2) Have a vertebral abnormality; (3) On glucocorticoid therapy for more than 3 months; (4) Have primary hyperparathyroidism; (5) On osteoporosis medications and need to assess response to drug therapy  · Last bone density test (DXA Scan): Not on file  5  HIV Screening: covered annually if you're between the age of 12-76  Also covered annually if you are younger than 13 and older than 72 with risk factors for HIV infection  For pregnant patients, it is covered up to 3 times per pregnancy      Immunizations:  Immunization Recommendations   Influenza Vaccine Annual influenza vaccination during flu season is recommended for all persons aged >= 6 months who do not have contraindications   Pneumococcal Vaccine (Prevnar and Pneumovax)  * Prevnar = PCV13  * Pneumovax = PPSV23   Adults 25-60 years old: 1-3 doses may be recommended based on certain risk factors  Adults 72 years old: Prevnar (PCV13) vaccine recommended followed by Pneumovax (PPSV23) vaccine  If already received PPSV23 since turning 65, then PCV13 recommended at least one year after PPSV23 dose  Hepatitis B Vaccine 3 dose series if at intermediate or high risk (ex: diabetes, end stage renal disease, liver disease)   Tetanus (Td) Vaccine - COST NOT COVERED BY MEDICARE PART B Following completion of primary series, a booster dose should be given every 10 years to maintain immunity against tetanus  Td may also be given as tetanus wound prophylaxis  Tdap Vaccine - COST NOT COVERED BY MEDICARE PART B Recommended at least once for all adults  For pregnant patients, recommended with each pregnancy  Shingles Vaccine (Shingrix) - COST NOT COVERED BY MEDICARE PART B  2 shot series recommended in those aged 48 and above     Health Maintenance Due:      Topic Date Due    Hepatitis C Screening  Never done    HIV Screening  Never done    MAMMOGRAM  02/24/2022    Colorectal Cancer Screening  03/12/2030     Immunizations Due:  There are no preventive care reminders to display for this patient  Advance Directives   What are advance directives? Advance directives are legal documents that state your wishes and plans for medical care  These plans are made ahead of time in case you lose your ability to make decisions for yourself  Advance directives can apply to any medical decision, such as the treatments you want, and if you want to donate organs  What are the types of advance directives? There are many types of advance directives, and each state has rules about how to use them  You may choose a combination of any of the following:  · Living will: This is a written record of the treatment you want  You can also choose which treatments you do not want, which to limit, and which to stop at a certain time  This includes surgery, medicine, IV fluid, and tube feedings  · Durable power of  for healthcare [facility]): This is a written record that states who you want to make healthcare choices for you when you are unable to make them for yourself  This person, called a proxy, is usually a family member or a friend  You may choose more than 1 proxy  · Do not resuscitate (DNR) order:  A DNR order is used in case your heart stops beating or you stop breathing  It is a request not to have certain forms of treatment, such as CPR  A DNR order may be included in other types of advance directives  · Medical directive: This covers the care that you want if you are in a coma, near death, or unable to make decisions for yourself  You can list the treatments you want for each condition  Treatment may include pain medicine, surgery, blood transfusions, dialysis, IV or tube feedings, and a ventilator (breathing machine)  · Values history: This document has questions about your views, beliefs, and how you feel and think about life  This information can help others choose the care that you would choose  Why are advance directives important? An advance directive helps you control your care  Although spoken wishes may be used, it is better to have your wishes written down  Spoken wishes can be misunderstood, or not followed  Treatments may be given even if you do not want them  An advance directive may make it easier for your family to make difficult choices about your care  Weight Management   Why it is important to manage your weight:  Being overweight increases your risk of health conditions such as heart disease, high blood pressure, type 2 diabetes, and certain types of cancer   It can also increase your risk for osteoarthritis, sleep apnea, and other respiratory problems  Aim for a slow, steady weight loss  Even a small amount of weight loss can lower your risk of health problems  How to lose weight safely:  A safe and healthy way to lose weight is to eat fewer calories and get regular exercise  You can lose up about 1 pound a week by decreasing the number of calories you eat by 500 calories each day  Healthy meal plan for weight management:  A healthy meal plan includes a variety of foods, contains fewer calories, and helps you stay healthy  A healthy meal plan includes the following:  · Eat whole-grain foods more often  A healthy meal plan should contain fiber  Fiber is the part of grains, fruits, and vegetables that is not broken down by your body  Whole-grain foods are healthy and provide extra fiber in your diet  Some examples of whole-grain foods are whole-wheat breads and pastas, oatmeal, brown rice, and bulgur  · Eat a variety of vegetables every day  Include dark, leafy greens such as spinach, kale, lis greens, and mustard greens  Eat yellow and orange vegetables such as carrots, sweet potatoes, and winter squash  · Eat a variety of fruits every day  Choose fresh or canned fruit (canned in its own juice or light syrup) instead of juice  Fruit juice has very little or no fiber  · Eat low-fat dairy foods  Drink fat-free (skim) milk or 1% milk  Eat fat-free yogurt and low-fat cottage cheese  Try low-fat cheeses such as mozzarella and other reduced-fat cheeses  · Choose meat and other protein foods that are low in fat  Choose beans or other legumes such as split peas or lentils  Choose fish, skinless poultry (chicken or turkey), or lean cuts of red meat (beef or pork)  Before you cook meat or poultry, cut off any visible fat  · Use less fat and oil  Try baking foods instead of frying them   Add less fat, such as margarine, sour cream, regular salad dressing and mayonnaise to foods  Eat fewer high-fat foods  Some examples of high-fat foods include french fries, doughnuts, ice cream, and cakes  · Eat fewer sweets  Limit foods and drinks that are high in sugar  This includes candy, cookies, regular soda, and sweetened drinks  Exercise:  Exercise at least 30 minutes per day on most days of the week  Some examples of exercise include walking, biking, dancing, and swimming  You can also fit in more physical activity by taking the stairs instead of the elevator or parking farther away from stores  Ask your healthcare provider about the best exercise plan for you  © Copyright [employer] 2018 Information is for End User's use only and may not be sold, redistributed or otherwise used for commercial purposes   All illustrations and images included in CareNotes® are the copyrighted property of A D A M , Inc  or [address] [place], [place] [address]

## 2021-05-28 NOTE — PROGRESS NOTES
Assessment/Plan:       Diagnoses and all orders for this visit:    Welcome to Medicare preventive visit  -     POCT ECG    Gastroesophageal reflux disease without esophagitis  -     pantoprazole (PROTONIX) 40 mg tablet; Take 1 tablet (40 mg total) by mouth daily    Neck pain, bilateral  -     XR spine cervical complete 4 or 5 vw non injury; Future    Lower abdominal pain  -     POCT urine dip  -     Urine culture; Future  -     Urinalysis with microscopic  -     nitrofurantoin (MACROBID) 100 mg capsule; Take 1 capsule (100 mg total) by mouth 2 (two) times a day for 5 days  -     Urine culture    Dizziness    Pain in both lower extremities    Other orders  -     Cancel: Hepatitis C Antibody ([facility]); Future  -     Cancel: HIV 1/2 Antigen/Antibody (4th Generation) w Reflex [facility]; Future      Will send urine for culture testing  Will start her on a five day course of Macrobid as she is traveling again next week  Refill provided for GERD  Will start with an XR of the cervical spine due to her ongoing neck pain  Discussed using a cushion for her leg pain while driving in the car for long hours  Subjective:      Patient ID: Shemar Miller is a 72 y o  female  Here today for an AWV with acute complaints  Reports she feels she is starting a UTI - has been traveling recently in which she drives far distances - notes she does not drink often and holds her urine too long  Reports of low abdominal pain and back pain  Also notes of some intermittent dizziness at times  Also reports some leg pain while sitting in her car and driving  Also notes she is still having the anterior neck pain that was attributed to possible dental issues but her tooth has since been pulled         The following portions of the patient's history were reviewed and updated as appropriate: allergies, current medications, past family history, past medical history, past social history, past surgical history and problem list     Review of Systems   Constitutional: Negative  Respiratory: Negative  Cardiovascular: Negative  Gastrointestinal: Positive for abdominal pain  Musculoskeletal: Positive for back pain and neck pain  Neurological: Positive for dizziness  Objective:      /78 (BP Location: Left arm, Patient Position: Sitting, Cuff Size: Standard)   Pulse 63   Temp 98 °F (36 7 °C)   Ht 5' 3" (1 6 m)   Wt 64 4 kg (142 lb)   SpO2 98%   BMI 25 15 kg/m²          Physical Exam  Constitutional:       Appearance: Normal appearance  HENT:      Head: Normocephalic and atraumatic  Cardiovascular:      Rate and Rhythm: Normal rate and regular rhythm  Heart sounds: No murmur  No gallop  Pulmonary:      Effort: Pulmonary effort is normal  No respiratory distress  Breath sounds: Normal breath sounds  No wheezing or rales  Abdominal:      General: Abdomen is flat  Palpations: Abdomen is soft  Skin:     General: Skin is warm and dry  Neurological:      General: No focal deficit present  Mental Status: She is alert and oriented to person, place, and time  Mental status is at baseline

## 2021-05-28 NOTE — PROGRESS NOTES
Yasmany Frias is here for her Welcome to Medicare visit  Last Medicare Wellness visit information reviewed, patient interviewed and updates made to the record today  Health Risk Assessment:   Patient rates overall health as good  Patient feels that their physical health rating is slightly worse  Patient is satisfied with their life  Eyesight was rated as same  Hearing was rated as same  Patient feels that their emotional and mental health rating is same  Patients states they are never, rarely angry  Patient states they are always unusually tired/fatigued  Pain experienced in the last 7 days has been a lot  Patient's pain rating has been 8/10  Patient states that she has experienced no weight loss or gain in last 6 months  Depression Screening:   PHQ-2 Score: 0      Fall Risk Screening: In the past year, patient has experienced: no history of falling in past year      Urinary Incontinence Screening:   Patient has not leaked urine accidently in the last six months  Home Safety:  Patient has trouble with stairs inside or outside of their home  Patient has working smoke alarms and has no working carbon monoxide detector  Home safety hazards include: household clutter and not having non-slip bath and/or shower mats  Nutrition:   Current diet is Low Carb  Medications:   Patient is not currently taking any over-the-counter supplements  Patient is able to manage medications       Activities of Daily Living (ADLs)/Instrumental Activities of Daily Living (IADLs):   Walk and transfer into and out of bed and chair?: Yes  Dress and groom yourself?: Yes    Bathe or shower yourself?: Yes    Do your laundry/housekeeping?: Yes  Manage your money, pay your bills and track your expenses?: Yes  Make your own meals?: Yes    Do your own shopping?: Yes    Previous Hospitalizations:   Any hospitalizations or ED visits within the last 12 months?: No      Advance Care Planning:   Living will: No    Durable POA for healthcare: No    Advanced directive: No    Five wishes given: Yes      Cognitive Screening:   Provider or family/friend/caregiver concerned regarding cognition?: No    PREVENTIVE SCREENINGS      Cardiovascular Screening:    General: Screening Current      Diabetes Screening:     General: Screening Current      Colorectal Cancer Screening:     General: Screening Current      Breast Cancer Screening:     General: Screening Current      Cervical Cancer Screening:    General: Screening Not Indicated      Lung Cancer Screening:     General: Screening Not Indicated    Screening, Brief Intervention, and Referral to Treatment (SBIRT)    Screening  Typical number of drinks in a day: 0  Typical number of drinks in a week: 0  Interpretation: Low risk drinking behavior  Single Item Drug Screening:  How often have you used an illegal drug (including marijuana) or a prescription medication for non-medical reasons in the past year? never    Single Item Drug Screen Score: 0  Interpretation: Negative screen for possible drug use disorder   Physical Exam  Constitutional:       Appearance: Normal appearance  HENT:      Head: Normocephalic and atraumatic  Cardiovascular:      Rate and Rhythm: Normal rate and regular rhythm  Heart sounds: No murmur  No gallop  Pulmonary:      Effort: Pulmonary effort is normal  No respiratory distress  Breath sounds: Normal breath sounds  No wheezing or rales  Abdominal:      General: Abdomen is flat  Palpations: Abdomen is soft  Skin:     General: Skin is warm and dry  Neurological:      General: No focal deficit present  Mental Status: She is alert and oriented to person, place, and time  Mental status is at baseline  Please see Additional note

## 2021-05-28 NOTE — PROGRESS NOTES
Assessment and Plan:     Problem List Items Addressed This Visit     None      Visit Diagnoses     Welcome to Medicare preventive visit    -  Primary    Relevant Orders    POCT ECG    Need for hepatitis C screening test        Screening for HIV (human immunodeficiency virus)               Preventive health issues were discussed with patient, and age appropriate screening tests were ordered as noted in patient's After Visit Summary  Personalized health advice and appropriate referrals for health education or preventive services given if needed, as noted in patient's After Visit Summary  History of Present Illness:     Patient presents for Welcome to Medicare visit       Patient Care Team:  ROSEANNE Molina as PCP - General (Family Medicine)     Review of Systems:     Review of Systems   Problem List:     Patient Active Problem List   Diagnosis    GERD (gastroesophageal reflux disease)    Arthritis      Past Medical and Surgical History:     Past Medical History:   Diagnosis Date    Arthritis     GERD (gastroesophageal reflux disease)      Past Surgical History:   Procedure Laterality Date    HYSTERECTOMY      partial hysterectomy age-43    TONSILLECTOMY        Family History:     Family History   Problem Relation Age of Onset    Diabetes Mother     Heart disease Father     No Known Problems Sister     No Known Problems Daughter     No Known Problems Maternal Grandmother     No Known Problems Maternal Grandfather     No Known Problems Paternal Grandmother     No Known Problems Paternal Grandfather     Ovarian cancer Daughter     No Known Problems Maternal Aunt     Ovarian cancer Maternal Aunt     Cancer Maternal Aunt     No Known Problems Maternal Aunt     No Known Problems Maternal Aunt     No Known Problems Maternal Aunt     No Known Problems Maternal Aunt     Breast cancer Paternal Aunt     No Known Problems Paternal Aunt       Social History:     E-Cigarette/Vaping    E-Cigarette Use Never User      E-Cigarette/Vaping Substances    Nicotine No     THC No     CBD No     Flavoring No     Other No     Unknown No      Social History     Socioeconomic History    Marital status:      Spouse name: None    Number of children: None    Years of education: None    Highest education level: None   Occupational History    None   Social Needs    Financial resource strain: None    Food insecurity     Worry: None     Inability: None    Transportation needs     Medical: None     Non-medical: None   Tobacco Use    Smoking status: Never Smoker    Smokeless tobacco: Never Used   Substance and Sexual Activity    Alcohol use: Not Currently    Drug use: Not Currently    Sexual activity: Not Currently   Lifestyle    Physical activity     Days per week: None     Minutes per session: None    Stress: None   Relationships    Social connections     Talks on phone: None     Gets together: None     Attends Druze service: None     Active member of club or organization: None     Attends meetings of clubs or organizations: None     Relationship status: None    Intimate partner violence     Fear of current or ex partner: None     Emotionally abused: None     Physically abused: None     Forced sexual activity: None   Other Topics Concern    None   Social History Narrative    None      Medications and Allergies:     Current Outpatient Medications   Medication Sig Dispense Refill    aspirin (ECOTRIN LOW STRENGTH) 81 mg EC tablet Take 81 mg by mouth      pantoprazole (PROTONIX) 40 mg tablet Take 40 mg by mouth daily       dicyclomine (BENTYL) 20 mg tablet Take 20 mg by mouth 2 (two) times a day      meloxicam (MOBIC) 7 5 mg tablet Take 7 5 mg by mouth       No current facility-administered medications for this visit        Allergies   Allergen Reactions    Erythromycin GI Intolerance      Immunizations:     Immunization History   Administered Date(s) Administered    H1N1, All Formulations 12/29/2009    SARS-CoV-2 / COVID-19 mRNA IM (Moderna) 03/25/2021, 04/23/2021    Tdap 04/17/2018      Health Maintenance:         Topic Date Due    Hepatitis C Screening  Never done    HIV Screening  Never done    MAMMOGRAM  02/24/2022    Colorectal Cancer Screening  03/12/2030     There are no preventive care reminders to display for this patient     Medicare Screening Tests and Risk Assessments:     Annual Wellness Visit  No exam data present     Physical Exam:     /78 (BP Location: Left arm, Patient Position: Sitting, Cuff Size: Standard)   Pulse 63   Temp 98 °F (36 7 °C)   Ht 5' 3" (1 6 m)   Wt 64 4 kg (142 lb)   SpO2 98%   BMI 25 15 kg/m²     Physical Exam     ROSEANNE Belcher

## 2021-05-29 LAB — BACTERIA UR CULT: NORMAL

## 2021-06-02 ENCOUNTER — TELEPHONE (OUTPATIENT)
Dept: FAMILY MEDICINE CLINIC | Facility: CLINIC | Age: 66
End: 2021-06-02

## 2021-06-02 DIAGNOSIS — R10.30 LOWER ABDOMINAL PAIN: Primary | ICD-10-CM

## 2021-06-02 DIAGNOSIS — R35.0 URINARY FREQUENCY: Primary | ICD-10-CM

## 2021-06-02 RX ORDER — OXYBUTYNIN CHLORIDE 5 MG/1
5 TABLET, EXTENDED RELEASE ORAL DAILY
Qty: 30 TABLET | Refills: 1 | Status: SHIPPED | OUTPATIENT
Start: 2021-06-02 | End: 2021-07-02

## 2021-06-02 NOTE — TELEPHONE ENCOUNTER
Patient called back  She agrees to GI and would like to see GI at [facility]    Can you please place a referral

## 2021-07-02 DIAGNOSIS — R35.0 URINARY FREQUENCY: ICD-10-CM

## 2021-07-02 RX ORDER — OXYBUTYNIN CHLORIDE 5 MG/1
TABLET, EXTENDED RELEASE ORAL
Qty: 30 TABLET | Refills: 1 | Status: SHIPPED | OUTPATIENT
Start: 2021-07-02 | End: 2021-08-04

## 2021-08-04 DIAGNOSIS — R35.0 URINARY FREQUENCY: ICD-10-CM

## 2021-08-04 RX ORDER — OXYBUTYNIN CHLORIDE 5 MG/1
TABLET, EXTENDED RELEASE ORAL
Qty: 30 TABLET | Refills: 1 | Status: SHIPPED | OUTPATIENT
Start: 2021-08-04 | End: 2021-09-05

## 2021-08-14 DIAGNOSIS — K21.9 GASTROESOPHAGEAL REFLUX DISEASE WITHOUT ESOPHAGITIS: ICD-10-CM

## 2021-08-16 RX ORDER — PANTOPRAZOLE SODIUM 40 MG/1
TABLET, DELAYED RELEASE ORAL
Qty: 90 TABLET | Refills: 0 | Status: SHIPPED | OUTPATIENT
Start: 2021-08-16 | End: 2021-11-16

## 2021-09-30 DIAGNOSIS — R35.0 URINARY FREQUENCY: ICD-10-CM

## 2021-09-30 RX ORDER — OXYBUTYNIN CHLORIDE 5 MG/1
TABLET, EXTENDED RELEASE ORAL
Qty: 30 TABLET | Refills: 1 | Status: SHIPPED | OUTPATIENT
Start: 2021-09-30 | End: 2021-10-14

## 2021-10-14 DIAGNOSIS — R35.0 URINARY FREQUENCY: ICD-10-CM

## 2021-10-14 RX ORDER — OXYBUTYNIN CHLORIDE 5 MG/1
TABLET, EXTENDED RELEASE ORAL
Qty: 90 TABLET | Refills: 1 | Status: SHIPPED | OUTPATIENT
Start: 2021-10-14 | End: 2022-04-26

## 2021-11-16 DIAGNOSIS — K21.9 GASTROESOPHAGEAL REFLUX DISEASE WITHOUT ESOPHAGITIS: ICD-10-CM

## 2021-11-16 RX ORDER — PANTOPRAZOLE SODIUM 40 MG/1
TABLET, DELAYED RELEASE ORAL
Qty: 90 TABLET | Refills: 0 | Status: SHIPPED | OUTPATIENT
Start: 2021-11-16 | End: 2022-03-16

## 2022-03-16 DIAGNOSIS — K21.9 GASTROESOPHAGEAL REFLUX DISEASE WITHOUT ESOPHAGITIS: ICD-10-CM

## 2022-03-16 RX ORDER — PANTOPRAZOLE SODIUM 40 MG/1
TABLET, DELAYED RELEASE ORAL
Qty: 90 TABLET | Refills: 0 | Status: SHIPPED | OUTPATIENT
Start: 2022-03-16 | End: 2022-06-21

## 2022-04-22 ENCOUNTER — TELEPHONE (OUTPATIENT)
Dept: ADMINISTRATIVE | Facility: OTHER | Age: 67
End: 2022-04-22

## 2022-04-22 NOTE — TELEPHONE ENCOUNTER
Upon review of the In Basket request we were able to locate, review, and update the patient chart as requested for CRC: Colonoscopy  Any additional questions or concerns should be emailed to the Practice Liaisons via [email or web address] email, please do not reply via In Basket      Thank you  Pricilla Ingram

## 2022-04-22 NOTE — TELEPHONE ENCOUNTER
----- Message from Austin Valenzuela sent at 4/21/2022  2:00 PM EDT -----  Regarding: care gap request  04/21/22 2:00 PM    Hello, our patient attached above has had CRC: Colonoscopy completed/performed  Please assist in updating the patient chart by pulling the Care Everywhere (CE) document  The date of service is 3/12/2020       Thank you,  Austin Valenzuela [facility]

## 2022-04-26 ENCOUNTER — OFFICE VISIT (OUTPATIENT)
Dept: FAMILY MEDICINE CLINIC | Facility: CLINIC | Age: 67
End: 2022-04-26
Payer: COMMERCIAL

## 2022-04-26 VITALS
OXYGEN SATURATION: 96 % | TEMPERATURE: 97.8 F | BODY MASS INDEX: 25.69 KG/M2 | HEART RATE: 70 BPM | DIASTOLIC BLOOD PRESSURE: 68 MMHG | HEIGHT: 63 IN | SYSTOLIC BLOOD PRESSURE: 122 MMHG | WEIGHT: 145 LBS

## 2022-04-26 DIAGNOSIS — Z78.0 POSTMENOPAUSE: ICD-10-CM

## 2022-04-26 DIAGNOSIS — R42 DIZZINESS: ICD-10-CM

## 2022-04-26 DIAGNOSIS — R07.89 CHEST PRESSURE: Primary | ICD-10-CM

## 2022-04-26 DIAGNOSIS — Z12.31 ENCOUNTER FOR SCREENING MAMMOGRAM FOR BREAST CANCER: ICD-10-CM

## 2022-04-26 DIAGNOSIS — Z11.59 NEED FOR HEPATITIS C SCREENING TEST: ICD-10-CM

## 2022-04-26 DIAGNOSIS — R06.01 ORTHOPNEA: ICD-10-CM

## 2022-04-26 PROCEDURE — 3008F BODY MASS INDEX DOCD: CPT | Performed by: INTERNAL MEDICINE

## 2022-04-26 PROCEDURE — 1160F RVW MEDS BY RX/DR IN RCRD: CPT | Performed by: INTERNAL MEDICINE

## 2022-04-26 PROCEDURE — 3725F SCREEN DEPRESSION PERFORMED: CPT | Performed by: INTERNAL MEDICINE

## 2022-04-26 PROCEDURE — 1101F PT FALLS ASSESS-DOCD LE1/YR: CPT | Performed by: INTERNAL MEDICINE

## 2022-04-26 PROCEDURE — 93000 ELECTROCARDIOGRAM COMPLETE: CPT | Performed by: INTERNAL MEDICINE

## 2022-04-26 PROCEDURE — 99214 OFFICE O/P EST MOD 30 MIN: CPT | Performed by: INTERNAL MEDICINE

## 2022-04-26 PROCEDURE — 1036F TOBACCO NON-USER: CPT | Performed by: INTERNAL MEDICINE

## 2022-04-26 PROCEDURE — 3288F FALL RISK ASSESSMENT DOCD: CPT | Performed by: INTERNAL MEDICINE

## 2022-04-26 NOTE — ASSESSMENT & PLAN NOTE
Her EKG is unremarkable today  Her differential diagnosis includes congestive heart failure and coronary ischemia  I doubt that she had an acute MI  She does not appear to be in heart failure now and it is unusual that she had the single episode and really has not had any persistent symptoms  We will check an echocardiogram   Consider stress testing

## 2022-04-26 NOTE — PROGRESS NOTES
Assessment/Plan:    Chest pressure  Her EKG is unremarkable today  Her differential diagnosis includes congestive heart failure and coronary ischemia  I doubt that she had an acute MI  She does not appear to be in heart failure now and it is unusual that she had the single episode and really has not had any persistent symptoms  We will check an echocardiogram   Consider stress testing  Dizziness  Unclear etiology  Will check labs  Consider Holter monitor  Chief Complaint     Follow-up; Care Gap Request; Tingling; Nail Problem          Patient ID: Juni Delarosa is a 77 y o  female who returns for evaluation of chest discomfort and shortness of breath  She had been seeing Ravi bautista but wants to switch to my practice  She woke up in the middle of the night with a feeling that "someone was stepping on my chest " She had to sit up to catch her breath  It took about 15 minutes for her to get back to normal before she could lay back down  She was able to go back to sleep  Since then, she hasn't had any further episodes of chest discomfort or shortness of breath  She also reports some light headedness with going up the stairs  No syncope  Dizziness has been ongoing for the last year, intermittently  She also has been reporting some tingling in her feet and numbness in her left hand and that her nails appear to be coiling upward  We agreed that we would focus our assessments today on her chest discomfort, shortness of breath, and dizziness  Objective:    /68 (BP Location: Right arm, Patient Position: Sitting, Cuff Size: Large)   Pulse 70   Temp 97 8 °F (36 6 °C)   Ht 5' 3" (1 6 m)   Wt 65 8 kg (145 lb)   SpO2 96%   BMI 25 69 kg/m²     Wt Readings from Last 3 Encounters:   04/26/22 65 8 kg (145 lb)   05/28/21 64 4 kg (142 lb)   04/24/21 65 3 kg (144 lb)         Physical Exam  Constitutional:       General: She is not in acute distress  Appearance: She is well-developed  Neck:      Vascular: No JVD  Cardiovascular:      Rate and Rhythm: Normal rate and regular rhythm  Heart sounds: Normal heart sounds  No murmur heard  No friction rub  No gallop  Pulmonary:      Breath sounds: Normal breath sounds  Abdominal:      General: Bowel sounds are normal  There is no distension  Palpations: Abdomen is soft  There is no mass  EKG:  Normal sinus rhythm at 60 beats per minute  There is a T-wave inversion in V1 which was noted on previous EKG  There is early transition of the R waves  No evidence of ischemia or infarction

## 2022-05-02 ENCOUNTER — APPOINTMENT (OUTPATIENT)
Dept: LAB | Facility: CLINIC | Age: 67
End: 2022-05-02
Payer: COMMERCIAL

## 2022-05-02 DIAGNOSIS — R42 DIZZINESS: ICD-10-CM

## 2022-05-02 LAB
ANION GAP SERPL CALCULATED.3IONS-SCNC: 1 MMOL/L (ref 4–13)
BUN SERPL-MCNC: 14 MG/DL (ref 5–25)
CALCIUM SERPL-MCNC: 9.1 MG/DL (ref 8.3–10.1)
CHLORIDE SERPL-SCNC: 111 MMOL/L (ref 100–108)
CO2 SERPL-SCNC: 29 MMOL/L (ref 21–32)
CREAT SERPL-MCNC: 0.74 MG/DL (ref 0.6–1.3)
ERYTHROCYTE [DISTWIDTH] IN BLOOD BY AUTOMATED COUNT: 13.2 % (ref 11.6–15.1)
GFR SERPL CREATININE-BSD FRML MDRD: 84 ML/MIN/1.73SQ M
GLUCOSE P FAST SERPL-MCNC: 95 MG/DL (ref 65–99)
HCT VFR BLD AUTO: 41.6 % (ref 34.8–46.1)
HGB BLD-MCNC: 13.3 G/DL (ref 11.5–15.4)
MCH RBC QN AUTO: 28.3 PG (ref 26.8–34.3)
MCHC RBC AUTO-ENTMCNC: 32 G/DL (ref 31.4–37.4)
MCV RBC AUTO: 89 FL (ref 82–98)
PLATELET # BLD AUTO: 191 THOUSANDS/UL (ref 149–390)
PMV BLD AUTO: 10.9 FL (ref 8.9–12.7)
POTASSIUM SERPL-SCNC: 4.4 MMOL/L (ref 3.5–5.3)
RBC # BLD AUTO: 4.7 MILLION/UL (ref 3.81–5.12)
SODIUM SERPL-SCNC: 141 MMOL/L (ref 136–145)
WBC # BLD AUTO: 4.62 THOUSAND/UL (ref 4.31–10.16)

## 2022-05-02 PROCEDURE — 85027 COMPLETE CBC AUTOMATED: CPT

## 2022-05-02 PROCEDURE — 80048 BASIC METABOLIC PNL TOTAL CA: CPT

## 2022-05-02 PROCEDURE — 36415 COLL VENOUS BLD VENIPUNCTURE: CPT

## 2022-05-03 ENCOUNTER — HOSPITAL ENCOUNTER (OUTPATIENT)
Dept: RADIOLOGY | Facility: CLINIC | Age: 67
Discharge: HOME | End: 2022-05-03
Payer: COMMERCIAL

## 2022-05-03 VITALS — HEIGHT: 62 IN | WEIGHT: 146.2 LBS | BODY MASS INDEX: 26.91 KG/M2

## 2022-05-03 DIAGNOSIS — Z78.0 POSTMENOPAUSE: ICD-10-CM

## 2022-05-03 DIAGNOSIS — Z12.31 ENCOUNTER FOR SCREENING MAMMOGRAM FOR BREAST CANCER: ICD-10-CM

## 2022-05-03 PROCEDURE — 77063 BREAST TOMOSYNTHESIS BI: CPT

## 2022-05-03 PROCEDURE — 77067 SCR MAMMO BI INCL CAD: CPT

## 2022-05-03 PROCEDURE — 77080 DXA BONE DENSITY AXIAL: CPT

## 2022-05-16 ENCOUNTER — APPOINTMENT (OUTPATIENT)
Dept: LAB | Facility: CLINIC | Age: 67
End: 2022-05-16
Payer: COMMERCIAL

## 2022-05-16 DIAGNOSIS — M81.0 AGE-RELATED OSTEOPOROSIS WITHOUT CURRENT PATHOLOGICAL FRACTURE: ICD-10-CM

## 2022-05-16 LAB
25(OH)D3 SERPL-MCNC: 23.1 NG/ML (ref 30–100)
ALBUMIN SERPL BCP-MCNC: 4 G/DL (ref 3.5–5)
ALP SERPL-CCNC: 71 U/L (ref 46–116)
ALT SERPL W P-5'-P-CCNC: 25 U/L (ref 12–78)
AST SERPL W P-5'-P-CCNC: 13 U/L (ref 5–45)
BILIRUB DIRECT SERPL-MCNC: 0.12 MG/DL (ref 0–0.2)
BILIRUB SERPL-MCNC: 0.39 MG/DL (ref 0.2–1)
PROT SERPL-MCNC: 6.6 G/DL (ref 6.4–8.2)

## 2022-05-16 PROCEDURE — 36415 COLL VENOUS BLD VENIPUNCTURE: CPT

## 2022-05-16 PROCEDURE — 82306 VITAMIN D 25 HYDROXY: CPT

## 2022-05-16 PROCEDURE — 80076 HEPATIC FUNCTION PANEL: CPT

## 2022-05-19 ENCOUNTER — HOSPITAL ENCOUNTER (OUTPATIENT)
Dept: NON INVASIVE DIAGNOSTICS | Facility: HOSPITAL | Age: 67
Discharge: HOME | End: 2022-05-19
Attending: INTERNAL MEDICINE
Payer: COMMERCIAL

## 2022-05-19 VITALS
DIASTOLIC BLOOD PRESSURE: 68 MMHG | WEIGHT: 146 LBS | BODY MASS INDEX: 27.56 KG/M2 | HEIGHT: 61 IN | SYSTOLIC BLOOD PRESSURE: 122 MMHG | HEART RATE: 68 BPM

## 2022-05-19 DIAGNOSIS — R06.01 ORTHOPNEA: ICD-10-CM

## 2022-05-19 DIAGNOSIS — R07.89 CHEST PRESSURE: ICD-10-CM

## 2022-05-19 LAB
AORTIC VALVE ANNULUS: 2.6 CM
APICAL FOUR CHAMBER EJECTION FRACTION: 65 %
ASCENDING AORTA: 2.5 CM
E WAVE DECELERATION TIME: 206 MS
FRACTIONAL SHORTENING: 34 (ref 28–44)
INTERVENTRICULAR SEPTUM IN DIASTOLE (PARASTERNAL SHORT AXIS VIEW): 0.8 CM
INTERVENTRICULAR SEPTUM: 0.8 CM (ref 0.6–1.1)
LAAS-AP4: 14 CM2
LEFT ATRIUM SIZE: 3.2 CM
LEFT INTERNAL DIMENSION IN SYSTOLE: 2.9 CM (ref 2.1–4)
LEFT VENTRICULAR INTERNAL DIMENSION IN DIASTOLE: 4.4 CM (ref 3.5–6)
LEFT VENTRICULAR POSTERIOR WALL IN END DIASTOLE: 0.7 CM
LEFT VENTRICULAR STROKE VOLUME: 55 ML
LVSV (TEICH): 55 ML
MV E'TISSUE VEL-LAT: 8 CM/S
MV E'TISSUE VEL-SEP: 7 CM/S
MV PEAK A VEL: 1.08 M/S
MV PEAK E VEL: 76 CM/S
RIGHT ATRIAL 2D VOLUME: 28 ML
RIGHT ATRIUM AREA SYSTOLE A4C: 12.4 CM2
RIGHT VENTRICLE ID DIMENSION: 2.6 CM
SL CV PED ECHO LEFT VENTRICLE DIASTOLIC VOLUME (MOD BIPLANE) 2D: 87 ML
SL CV PED ECHO LEFT VENTRICLE SYSTOLIC VOLUME (MOD BIPLANE) 2D: 32 ML
TR MAX PG: 21 MMHG
TR PEAK VELOCITY: 2.2 M/S
TRICUSPID ANNULAR PLANE SYSTOLIC EXCURSION: 1.6 CM
TRICUSPID VALVE PEAK REGURGITATION VELOCITY: 2.23 M/S

## 2022-05-19 PROCEDURE — 93306 TTE W/DOPPLER COMPLETE: CPT

## 2022-05-31 ENCOUNTER — OFFICE VISIT (OUTPATIENT)
Dept: FAMILY MEDICINE CLINIC | Facility: CLINIC | Age: 67
End: 2022-05-31
Payer: COMMERCIAL

## 2022-05-31 VITALS
WEIGHT: 147 LBS | OXYGEN SATURATION: 98 % | HEART RATE: 77 BPM | BODY MASS INDEX: 27.75 KG/M2 | TEMPERATURE: 98 F | HEIGHT: 61 IN | DIASTOLIC BLOOD PRESSURE: 74 MMHG | SYSTOLIC BLOOD PRESSURE: 118 MMHG

## 2022-05-31 DIAGNOSIS — E55.9 VITAMIN D DEFICIENCY: ICD-10-CM

## 2022-05-31 DIAGNOSIS — M54.41 CHRONIC RIGHT-SIDED LOW BACK PAIN WITH RIGHT-SIDED SCIATICA: Primary | ICD-10-CM

## 2022-05-31 DIAGNOSIS — G89.29 CHRONIC RIGHT-SIDED LOW BACK PAIN WITH RIGHT-SIDED SCIATICA: Primary | ICD-10-CM

## 2022-05-31 DIAGNOSIS — G47.09 OTHER INSOMNIA: ICD-10-CM

## 2022-05-31 DIAGNOSIS — R07.89 CHEST PRESSURE: ICD-10-CM

## 2022-05-31 PROCEDURE — 1160F RVW MEDS BY RX/DR IN RCRD: CPT | Performed by: INTERNAL MEDICINE

## 2022-05-31 PROCEDURE — 3288F FALL RISK ASSESSMENT DOCD: CPT | Performed by: INTERNAL MEDICINE

## 2022-05-31 PROCEDURE — 1170F FXNL STATUS ASSESSED: CPT | Performed by: INTERNAL MEDICINE

## 2022-05-31 PROCEDURE — 1036F TOBACCO NON-USER: CPT | Performed by: INTERNAL MEDICINE

## 2022-05-31 PROCEDURE — 1125F AMNT PAIN NOTED PAIN PRSNT: CPT | Performed by: INTERNAL MEDICINE

## 2022-05-31 PROCEDURE — 99214 OFFICE O/P EST MOD 30 MIN: CPT | Performed by: INTERNAL MEDICINE

## 2022-05-31 PROCEDURE — G0438 PPPS, INITIAL VISIT: HCPCS | Performed by: INTERNAL MEDICINE

## 2022-05-31 PROCEDURE — 3725F SCREEN DEPRESSION PERFORMED: CPT | Performed by: INTERNAL MEDICINE

## 2022-05-31 PROCEDURE — 1090F PRES/ABSN URINE INCON ASSESS: CPT | Performed by: INTERNAL MEDICINE

## 2022-05-31 PROCEDURE — 3008F BODY MASS INDEX DOCD: CPT | Performed by: INTERNAL MEDICINE

## 2022-05-31 NOTE — ASSESSMENT & PLAN NOTE
Vitamin-D level was 23 1 which is only slightly low  Recommended over-the-counter vitamin-D 1000 units daily

## 2022-05-31 NOTE — PROGRESS NOTES
Miguel A Gipson is here for her Subsequent Wellness visit  Last Medicare Wellness visit information reviewed, patient interviewed and updates made to the record today  Health Risk Assessment:   Patient rates overall health as good  Patient feels that their physical health rating is slightly worse  Patient is satisfied with their life  Eyesight was rated as same  Hearing was rated as same  Patient feels that their emotional and mental health rating is slightly better  Patients states they are never, rarely angry  Patient states they are always unusually tired/fatigued  Pain experienced in the last 7 days has been some  Patient's pain rating has been 7/10  Patient states that she has experienced no weight loss or gain in last 6 months  Depression Screening:   PHQ-2 Score: 0      Fall Risk Screening: In the past year, patient has experienced: no history of falling in past year      Urinary Incontinence Screening:   Patient has not leaked urine accidently in the last six months  Home Safety:  Patient has trouble with stairs inside or outside of their home  Patient has working smoke alarms and has working carbon monoxide detector  Home safety hazards include: none  Her daughter's house has steep stairs so she is very cautious  Nutrition:   Current diet is Regular  Medications:   Patient is currently taking over-the-counter supplements  OTC medications include: see medication list  Patient is able to manage medications  Activities of Daily Living (ADLs)/Instrumental Activities of Daily Living (IADLs):   Walk and transfer into and out of bed and chair?: Yes  Dress and groom yourself?: Yes    Bathe or shower yourself?: Yes    Feed yourself?  Yes  Do your laundry/housekeeping?: Yes  Manage your money, pay your bills and track your expenses?: Yes  Make your own meals?: Yes    Do your own shopping?: Yes    Previous Hospitalizations:   Any hospitalizations or ED visits within the last 12 months?: No Advance Care Planning:   Living will: No    Advanced directive counseling given: Yes    Five wishes given: Yes    End of Life Decisions reviewed with patient: Yes    Provider agrees with end of life decisions: Yes      Comments: Sherlyn Kolb chooses comfort care measures in the event of a terminal diagnosis  PREVENTIVE SCREENINGS      Cardiovascular Screening:    General: Screening Current      Diabetes Screening:     General: Screening Current      Colorectal Cancer Screening:     General: Screening Current      Breast Cancer Screening:     General: Screening Current      Cervical Cancer Screening:    General: Screening Not Indicated      Osteoporosis Screening:    General: Screening Not Indicated and History Osteoporosis      Abdominal Aortic Aneurysm (AAA) Screening:        General: Screening Not Indicated      Lung Cancer Screening:     General: Screening Not Indicated      Hepatitis C Screening:    General: Screening Current    Screening, Brief Intervention, and Referral to Treatment (SBIRT)    Screening  Typical number of drinks in a day: 0  Typical number of drinks in a week: 0  Interpretation: Low risk drinking behavior  Single Item Drug Screening:  How often have you used an illegal drug (including marijuana) or a prescription medication for non-medical reasons in the past year? never    Single Item Drug Screen Score: 0  Interpretation: Negative screen for possible drug use disorder    Assessment/Plan:    Chest pressure  Will set up for exercise stress test     Chronic right-sided low back pain with right-sided sciatica  Will refer to physical therapy  Vitamin D deficiency  Vitamin-D level was 23 1 which is only slightly low  Recommended over-the-counter vitamin-D 1000 units daily  Other insomnia  Reviewed sleep hygiene rules with her  Recommended consistent bedtime and wake-up time every day  She should stop using her smart phone a couple of hours before going to bed    She should avoid napping during the day  BMI Counseling: Body mass index is 27 78 kg/m²  The BMI is above normal  Nutrition recommendations include encouraging healthy choices of fruits and vegetables  Rationale for BMI follow-up plan is due to patient being overweight or obese  Depression Screening and Follow-up Plan: Patient was screened for depression during today's encounter  They screened negative with a PHQ-2 score of 0  Note: at the very end of the visit she mentioned issues with voiding especially if she delays voiding and a concern that this could trigger urinary tract infections  I suggested that she should avoid on a regular schedule and not wait to urinate  It would be very unlikely that this in of itself would be a cause of urinary tract infection  Chief Complaint     Medicare Wellness Visit          Patient ID: Abhi Zamorano is a 77 y o  female who returns for routine follow up  She reports that she gets short of breath walking up the steps  She doesn't have any nocturnal dyspnea  Echo revealed normal LV function  She is tired much of the day but is only getting about 4 hours of sleep  She tends to nap during the day  She a long term problem with low back pain and right sciatica  She has had [place] in the past  She gained weight on gabapentin so she doesn't want to take that again  Objective:    /74 (BP Location: Left arm, Patient Position: Sitting, Cuff Size: Large)   Pulse 77   Temp 98 °F (36 7 °C)   Ht 5' 1" (1 549 m)   Wt 66 7 kg (147 lb)   SpO2 98%   BMI 27 78 kg/m²     Wt Readings from Last 3 Encounters:   05/31/22 66 7 kg (147 lb)   05/19/22 66 2 kg (146 lb)   05/03/22 66 3 kg (146 lb 3 2 oz)         Physical Exam    General:  Well-developed, well-nourished, in no acute distress  Cardiac:  Regular rate and rhythm, no murmur, gallop, or rub  There is no JVD or HJR  Lungs:  Clear to auscultation and percussion    Abdomen:  Soft, nontender, normoactive bowel sounds, no palpable masses, no hepatosplenomegaly  Extremities:  No clubbing, cyanosis, or edema  Musculoskeletal:  No reproducible tenderness on palpation of her lumbar spine or of the erector spinae in the lumbar region  There is some mild spasm on the right  Straight leg raise was negative bilaterally  Neurologic:  Motor was 5/5 in both of her lower extremities  DTRs were 1+ in the knee jerks and ankle jerks bilaterally

## 2022-05-31 NOTE — ASSESSMENT & PLAN NOTE
Reviewed sleep hygiene rules with her  Recommended consistent bedtime and wake-up time every day  She should stop using her smart phone a couple of hours before going to bed  She should avoid napping during the day

## 2022-06-13 ENCOUNTER — HOSPITAL ENCOUNTER (OUTPATIENT)
Dept: NON INVASIVE DIAGNOSTICS | Facility: HOSPITAL | Age: 67
Discharge: HOME | End: 2022-06-13
Payer: COMMERCIAL

## 2022-06-13 VITALS
HEIGHT: 61 IN | SYSTOLIC BLOOD PRESSURE: 122 MMHG | BODY MASS INDEX: 27.75 KG/M2 | WEIGHT: 147 LBS | DIASTOLIC BLOOD PRESSURE: 80 MMHG | HEART RATE: 69 BPM

## 2022-06-13 DIAGNOSIS — R07.89 CHEST PRESSURE: ICD-10-CM

## 2022-06-13 LAB
MAX HR PERCENT: 103 %
MAX HR: 160 BPM
RATE PRESSURE PRODUCT: NORMAL
SL CV STRESS RECOVERY BP: 126 MMHG
SL CV STRESS RECOVERY HR: 93 BPM
SL CV STRESS RECOVERY O2 SAT: 98 %
SL CV STRESS STAGE REACHED: 3
STRESS BASELINE BP: NORMAL MMHG
STRESS BASELINE HR: 69 BPM
STRESS O2 SAT REST: 97 %
STRESS PEAK HR: 160 BPM
STRESS POST ESTIMATED WORKLOAD: 9.3 METS
STRESS POST EXERCISE DUR MIN: 7 MIN
STRESS POST EXERCISE DUR SEC: 31 SEC
STRESS POST O2 SAT PEAK: 98 %
STRESS POST PEAK BP: 150 MMHG
STRESS ST DEPRESSION: 0 MM

## 2022-06-13 PROCEDURE — 93018 CV STRESS TEST I&R ONLY: CPT | Performed by: INTERNAL MEDICINE

## 2022-06-13 PROCEDURE — 93017 CV STRESS TEST TRACING ONLY: CPT

## 2022-06-13 PROCEDURE — 93016 CV STRESS TEST SUPVJ ONLY: CPT | Performed by: INTERNAL MEDICINE

## 2022-06-21 DIAGNOSIS — K21.9 GASTROESOPHAGEAL REFLUX DISEASE WITHOUT ESOPHAGITIS: ICD-10-CM

## 2022-06-21 RX ORDER — PANTOPRAZOLE SODIUM 40 MG/1
TABLET, DELAYED RELEASE ORAL
Qty: 90 TABLET | Refills: 0 | Status: SHIPPED | OUTPATIENT
Start: 2022-06-21

## 2022-06-28 LAB
CHEST PAIN STATEMENT: NORMAL
MAX DIASTOLIC BP: 82 MMHG
MAX HEART RATE: 160 BPM
MAX PREDICTED HEART RATE: 154 BPM
MAX. SYSTOLIC BP: 150 MMHG
PROTOCOL NAME: NORMAL
REASON FOR TERMINATION: NORMAL
TARGET HR FORMULA: NORMAL
TEST INDICATION: NORMAL
TIME IN EXERCISE PHASE: NORMAL

## 2022-07-12 ENCOUNTER — EVALUATION (OUTPATIENT)
Dept: PHYSICAL THERAPY | Facility: CLINIC | Age: 67
End: 2022-07-12
Payer: COMMERCIAL

## 2022-07-12 DIAGNOSIS — G89.29 CHRONIC RIGHT-SIDED LOW BACK PAIN WITH RIGHT-SIDED SCIATICA: Primary | ICD-10-CM

## 2022-07-12 DIAGNOSIS — G89.29 CHRONIC RIGHT SHOULDER PAIN: ICD-10-CM

## 2022-07-12 DIAGNOSIS — M54.41 CHRONIC RIGHT-SIDED LOW BACK PAIN WITH RIGHT-SIDED SCIATICA: Primary | ICD-10-CM

## 2022-07-12 DIAGNOSIS — M25.511 CHRONIC RIGHT SHOULDER PAIN: ICD-10-CM

## 2022-07-12 PROCEDURE — 97110 THERAPEUTIC EXERCISES: CPT | Performed by: PHYSICAL THERAPIST

## 2022-07-12 NOTE — PROGRESS NOTES
PT Evaluation     Today's date: 2022  Patient name: Lois Lara  : 1955  MRN: [id number]  Referring provider: Jeri Cline MD  Dx:   Encounter Diagnosis     ICD-10-CM    1  Chronic right-sided low back pain with right-sided sciatica  M54 41 Ambulatory referral to Physical Therapy    G89 29    2  Chronic right shoulder pain  M25 511     G89 29                   Assessment  Assessment details: Lois Lara is a 77 y o  female presenting to PT with pain, radicular symptoms, decreased lumbar range of motion, decreased strength, and decreased tolerance to activity  Upon examination, patient was found to have objective deficits as listed below and is displaying ss consistent with R sided sciatic nerve tension, piriformis syndrome and poor mobility in L/S  Pt is experiencing subsequent functional deficits including significant difficulty lifting, bending, squatting, and walking for periods of time  Additionally, pt has been experiencing increased R shoulder pain that started a few weeks ago  Pt denies any trauma to area, but notes that ROM and strength has been limited on her R side  Upon examination, symptoms seem arthritic in nature  Patient would benefit from skilled PT services to address these impairments and to maximize function in order to improve quality of life  Thank you for the referral   Impairments: abnormal or restricted ROM, activity intolerance, impaired physical strength, lacks appropriate home exercise program, pain with function and poor posture     Symptom irritability: moderateUnderstanding of Dx/Px/POC: excellent  Goals  STG  1) Lumbar ROM will improve 50% in 2 weeks  2) Pt will report 50% less nerve symptoms into R LE in 2 weeks  3) Pt will improve R shoulder ROM by 50% in 2 weeks  LTG  1) Pt's R shoulder strength will be >4/5 in all planes by DC  2) Lumbar ROM will improve to WNL in 6 weeks  3) Patient will not have radicular symptoms in 6 weeks    4) Patient will be independent in HEP within 6 weeks  5) R shoulder ROM will be WNL by DC  Plan  Patient would benefit from: skilled physical therapy  Planned modality interventions: cryotherapy and hydrotherapy  Planned therapy interventions: manual therapy, massage, patient education, abdominal trunk stabilization, postural training, strengthening, stretching, therapeutic activities, therapeutic exercise, flexibility, functional ROM exercises, body mechanics training, neuromuscular re-education, balance and home exercise program  Frequency: 2x week  Duration in weeks: 4  Treatment plan discussed with: patient        Subjective Evaluation    History of Present Illness  Mechanism of injury: Pt notes that she has been having R shoulder pain for a few weeks now  She notes that she has been taking ibuprofen and has noticed relief  States that she feels like her ROM and strength in her R side is limited  Pt notes that she has had back pain for years  She states that she does get nerve symptoms down her R LE, that goes down to her toes at times  She notes that walking for times and standing for times is difficult because she gets increased back pain  Pt notes that with cooking, she has to sit at times because she is uncomfortable  Pt notes that she does have difficulty sleeping, at times can only sleep ~ 4 hour periods  Pt notes that she does have pain with household chores, but states that they need to get done     Quality of life: excellent    Pain  Current pain ratin  At best pain ratin  At worst pain rating: 10  Location: centrally, mid/low back into her R LE  Quality: dull ache, needle-like, throbbing and sharp  Relieving factors: heat  Aggravating factors: stair climbing, walking, sitting and standing    Social Support  Steps to enter house: yes  Stairs in house: yes   Lives in: multiple-level home  Lives with: alone    Employment status: not working  Hand dominance: left      Diagnostic Tests  No diagnostic tests performed  Treatments  No previous or current treatments  Patient Goals  Patient goals for therapy: decreased pain, increased motion and increased strength          Objective     Concurrent Complaints  Positive for night pain and disturbed sleep  Negative for bladder dysfunction, bowel dysfunction and saddle (S4) numbness    Palpation   Left   Tenderness of the lumbar paraspinals, piriformis and quadratus lumborum  Right   Hypertonic in the piriformis and upper trapezius  Tenderness of the levator scapulae, lumbar paraspinals, pectoralis major, piriformis, quadratus lumborum, suboccipitals and upper trapezius  Trigger point to piriformis and upper trapezius       Neurological Testing     Sensation     Lumbar   Left   Intact: light touch    Right   Diminished: light touch    Comments   Right light touch: diminished on the lateral aspect of R LE    Active Range of Motion   Cervical/Thoracic Spine       Thoracic    Left lateral flexion:  Restriction level: moderate  Right lateral flexion:  Restriction level: moderate  Left rotation:  Restriction level: minimal  Right rotation:  Restriction level: minimal  Left Shoulder   Normal active range of motion  External rotation BTH: C7   Internal rotation BTB: T11 with pain    Right Shoulder   Flexion: 140 degrees   Abduction: 92 degrees with pain  External rotation BTH: C7   Internal rotation BTB: L1 with pain    Lumbar   Flexion:  with pain Restriction level: moderate  Extension:  Restriction level: moderate  Left lateral flexion:  with pain Restriction level: moderate  Right lateral flexion:  with pain Restriction level: moderate  Left rotation:  Restriction level: moderate  Right rotation:  Restriction level: moderate    Additional Active Range of Motion Details  Poor flexibility in Hamstring musculature, worse on R <50% limited   Mechanical Assessment    Cervical      Thoracic      Lumbar    Standing flexion: repeated movements   Pain location:peripheralized  Pain intensity: worse  Pain level: increased    Strength/Myotome Testing     Left Shoulder     Planes of Motion   Abduction: 4   External rotation at 90°: 4   Internal rotation at 90°: 4     Right Shoulder     Planes of Motion   Flexion: 3+   Abduction: 3+   External rotation at 90°: 3-   Internal rotation at 90°: 3+     Left Hip   Planes of Motion   Flexion: 4-  Abduction: 4-  Adduction: 4-    Right Hip   Planes of Motion   Flexion: 3+  Abduction: 4-  Adduction: 4-    Left Knee   Flexion: 4    Right Knee   Flexion: 4-  Extension: 4-    Left Ankle/Foot   Dorsiflexion: 4  Plantar flexion: 4  Inversion: 4  Eversion: 4    Right Ankle/Foot   Dorsiflexion: 4  Plantar flexion: 4  Inversion: 4  Eversion: 4    Tests     Right Shoulder   Positive Hawkin's  Negative drop arm, empty can and full can  Lumbar     Right   Positive passive SLR  Left Hip   Positive piriformis  Right Hip   Positive piriformis  Precautions:GERD     Daily Treatment Diary:      Initial Evaluation Date: 7/12  Compliance 7/12                     Visit Number 1                    Re-Eval  IE                 [facility]   Foto Captured Y                           7/12                     Manual                      L/S STM                      R shoulder ROM                                            Ther-Ex                      Nu step- warm up                      pulleys              Pball roll out                       LTR HEP                     SKC HEP                     Hip add                       clamshell                      Glut   bridge                      90/90 HEP            HS stretch HEP            C/S ROM HEP            Upper trap stretch HEP            Levator stretch HEP                                                                  Neuro Re-Ed                      PPT                      PPT with march                                                    Ther-Act Modalities

## 2022-07-13 PROCEDURE — 97162 PT EVAL MOD COMPLEX 30 MIN: CPT | Performed by: PHYSICAL THERAPIST

## 2022-07-21 ENCOUNTER — APPOINTMENT (OUTPATIENT)
Dept: PHYSICAL THERAPY | Facility: CLINIC | Age: 67
End: 2022-07-21
Payer: COMMERCIAL

## 2022-07-26 ENCOUNTER — OFFICE VISIT (OUTPATIENT)
Dept: PHYSICAL THERAPY | Facility: CLINIC | Age: 67
End: 2022-07-26
Payer: COMMERCIAL

## 2022-07-26 DIAGNOSIS — G89.29 CHRONIC RIGHT SHOULDER PAIN: ICD-10-CM

## 2022-07-26 DIAGNOSIS — M25.511 CHRONIC RIGHT SHOULDER PAIN: ICD-10-CM

## 2022-07-26 DIAGNOSIS — M54.41 CHRONIC RIGHT-SIDED LOW BACK PAIN WITH RIGHT-SIDED SCIATICA: Primary | ICD-10-CM

## 2022-07-26 DIAGNOSIS — G89.29 CHRONIC RIGHT-SIDED LOW BACK PAIN WITH RIGHT-SIDED SCIATICA: Primary | ICD-10-CM

## 2022-07-26 PROCEDURE — 97110 THERAPEUTIC EXERCISES: CPT | Performed by: PHYSICAL THERAPIST

## 2022-07-26 NOTE — PROGRESS NOTES
Daily Note     Today's date: 2022  Patient name: Terrence Stover  : 1955  MRN: [id number]  Referring provider: Macy Lloyd MD  Dx:   Encounter Diagnosis     ICD-10-CM    1  Chronic right-sided low back pain with right-sided sciatica  M54 41     G89 29    2  Chronic right shoulder pain  M25 511     G89 29                   Subjective: Pt notes that her mid back has been bothering her a lot when she wakes up in the morning  R shoulder continues to hurt daily  Objective: See treatment diary below      Assessment: Tolerated treatment well  Introduced new exercises today, she needed moderate cueing for proper form and intensity  Pt noted relief with Pball roll out exercise, educated pt to continue HEP at home to help with current symptoms  Pt only able to come 1x a week, educated her to continue HEP at home to help with current symptoms  Patient demonstrated fatigue post treatment and would benefit from continued PT to address current limitations  Plan: Continue per plan of care  Precautions:GERD     Daily Treatment Diary:      Initial Evaluation Date:   Compliance                    Visit Number 1 2                   Re-Eval  IE -                MC   Foto Captured Y -                                             Manual                      L/S STM   arb 10'                   R shoulder ROM   -                                         Ther-Ex                      Nu step- warm up   -                   pulleys   5'           Pball roll out    R, L, fwd 10x10"                   LTR HEP  10x10"                   SKC HEP 10x ea 5"                   Hip add  15x 5"                   clamshell   15x 5" red                   Glut  bridge                      90/90 HEP 10x ea           HS stretch HEP -           C/S ROM HEP -           Upper trap stretch HEP -           Levator stretch HEP  -                    thoracic ext      10x10"                   Supine cane shoulder flexion  15x                   Neuro Re-Ed                      PPT   15x 3"                   PPT with march  -                                                  Ther-Act                                                               Modalities

## 2022-08-04 ENCOUNTER — OFFICE VISIT (OUTPATIENT)
Dept: PHYSICAL THERAPY | Facility: CLINIC | Age: 67
End: 2022-08-04
Payer: COMMERCIAL

## 2022-08-04 DIAGNOSIS — G89.29 CHRONIC RIGHT-SIDED LOW BACK PAIN WITH RIGHT-SIDED SCIATICA: Primary | ICD-10-CM

## 2022-08-04 DIAGNOSIS — M54.41 CHRONIC RIGHT-SIDED LOW BACK PAIN WITH RIGHT-SIDED SCIATICA: Primary | ICD-10-CM

## 2022-08-04 DIAGNOSIS — G89.29 CHRONIC RIGHT SHOULDER PAIN: ICD-10-CM

## 2022-08-04 DIAGNOSIS — M25.511 CHRONIC RIGHT SHOULDER PAIN: ICD-10-CM

## 2022-08-04 PROCEDURE — 97110 THERAPEUTIC EXERCISES: CPT | Performed by: PHYSICAL THERAPIST

## 2022-08-04 NOTE — PROGRESS NOTES
Daily Note     Today's date: 2022  Patient name: Chavo Peralta  : 1955  MRN: [id number]  Referring provider: Joann Jenkins MD  Dx:   Encounter Diagnosis     ICD-10-CM    1  Chronic right-sided low back pain with right-sided sciatica  M54 41     G89 29    2  Chronic right shoulder pain  M25 511     G89 29                   Subjective: Pt notes that she tripped last night and fell on her couch  States that her back hurts this afternoon, but took some motrin and it is helping a little  Objective: See treatment diary below      Assessment: Tolerated treatment well  Pt was able to tolerate all exercise today with no increased pain  Continues to need cueing for proper form and intensity  Pt had difficulty with bridge exercise, but was able to perform 10 reps today  Pt requested [address] today following exercises and deferred manual work, will resume nv  Patient exhibited good technique with therapeutic exercises and would benefit from continued PT to address current limitations  Plan: Continue per plan of care  Precautions:GERD     Daily Treatment Diary:      Initial Evaluation Date:   Compliance                  Visit Number 1 2  3                 Re-Eval  IE -  -              MC   Foto Captured Y -  -                                         Manual                      L/S STM   arb 10'  def  R shoulder ROM   -                                         Ther-Ex                      Nu step- warm up   -  10'                  pulleys   5' 5'          Pball roll out    R, L, fwd 10x10"  R, L, fwd 10x10"                 LTR HEP  10x10"  10x10"                 SKC HEP 10x ea 5"  10x ea 5"                 Hip add  15x 5"  20x 5"                 clamshell   15x 5" red  20x green 5"                 Glut   bridge     10x                 90/90 HEP 10x ea 10x ea          HS stretch HEP - 3x30" ea          C/S ROM HEP -           Upper trap stretch HEP -           Levator stretch HEP  -                    thoracic ext      10x10"  -                 Supine cane shoulder flexion  15x  15x                 Neuro Re-Ed                      PPT   15x 3"  15x 3"                 PPT with march  - 10x 5"                                                 Ther-Act                                                               Modalities                       PRN   10' post

## 2022-08-08 ENCOUNTER — OFFICE VISIT (OUTPATIENT)
Dept: PHYSICAL THERAPY | Facility: CLINIC | Age: 67
End: 2022-08-08
Payer: COMMERCIAL

## 2022-08-08 DIAGNOSIS — M25.511 CHRONIC RIGHT SHOULDER PAIN: ICD-10-CM

## 2022-08-08 DIAGNOSIS — M54.41 CHRONIC RIGHT-SIDED LOW BACK PAIN WITH RIGHT-SIDED SCIATICA: Primary | ICD-10-CM

## 2022-08-08 DIAGNOSIS — G89.29 CHRONIC RIGHT-SIDED LOW BACK PAIN WITH RIGHT-SIDED SCIATICA: Primary | ICD-10-CM

## 2022-08-08 DIAGNOSIS — G89.29 CHRONIC RIGHT SHOULDER PAIN: ICD-10-CM

## 2022-08-08 PROCEDURE — 97110 THERAPEUTIC EXERCISES: CPT | Performed by: PHYSICAL THERAPIST

## 2022-08-08 NOTE — PROGRESS NOTES
Daily Note     Today's date: 2022  Patient name: Raúl Nails  : 1955  MRN: [id number]  Referring provider: Tomi Becerril MD  Dx:   Encounter Diagnosis     ICD-10-CM    1  Chronic right-sided low back pain with right-sided sciatica  M54 41     G89 29    2  Chronic right shoulder pain  M25 511     G89 29                   Subjective: Pt notes that her pain levels are improved in her back  Objective: See treatment diary below      Assessment: Tolerated treatment well  Pt was able to perform all exercise with less difficulty today  She noted no pain during bridge exercise  ROM in shoulder was improved from last week, but still limited compared to normal  Should improve with continued therapy  Patient exhibited good technique with therapeutic exercises and would benefit from continued PT to address current limitations  Plan: Continue per plan of care  Precautions:GERD     Daily Treatment Diary:      Initial Evaluation Date:   Compliance                Visit Number 1 2  3 4               Re-Eval  IE -  -  -            MC   Foto Captured Y -  -  -                                     Manual                      L/S STM   arb 10'  def   def  R shoulder ROM   -                                         Ther-Ex                      Nu step- warm up   -  10'   10'               pulleys   5' 5' 5'         Pball roll out    R, L, fwd 10x10"  R, L, fwd 10x10"  R, L, fwd 10x10"               LTR HEP  10x10"  10x10"  10x10"               SKC HEP 10x ea 5"  10x ea 5"  10x ea 5"               Hip add  15x 5"  20x 5"  30x 5"               clamshell   15x 5" red  20x green 5"  20x blue 5"               Glut  bridge     10x  10x                90/90 HEP 10x ea 10x ea 15x ea         HS stretch HEP - 3x30" ea 3x30" ea         C/S ROM HEP -           Upper trap stretch HEP -           Levator stretch HEP  -                    thoracic ext  10x10"  -  10x10"               Supine cane shoulder flexion  15x  15x  seated 15x                Neuro Re-Ed                      PPT   15x 3"  15x 3"  15x 3"               PPT with march  - 10x 5" 10x 5"                                                Ther-Act                                                               Modalities                       PRN   10' post 10' post

## 2022-08-16 ENCOUNTER — APPOINTMENT (OUTPATIENT)
Dept: PHYSICAL THERAPY | Facility: CLINIC | Age: 67
End: 2022-08-16
Payer: COMMERCIAL

## 2022-08-18 ENCOUNTER — OFFICE VISIT (OUTPATIENT)
Dept: PHYSICAL THERAPY | Facility: CLINIC | Age: 67
End: 2022-08-18
Payer: COMMERCIAL

## 2022-08-18 DIAGNOSIS — G89.29 CHRONIC RIGHT-SIDED LOW BACK PAIN WITH RIGHT-SIDED SCIATICA: Primary | ICD-10-CM

## 2022-08-18 DIAGNOSIS — M54.41 CHRONIC RIGHT-SIDED LOW BACK PAIN WITH RIGHT-SIDED SCIATICA: Primary | ICD-10-CM

## 2022-08-18 DIAGNOSIS — G89.29 CHRONIC RIGHT SHOULDER PAIN: ICD-10-CM

## 2022-08-18 DIAGNOSIS — M25.511 CHRONIC RIGHT SHOULDER PAIN: ICD-10-CM

## 2022-08-18 PROCEDURE — 97112 NEUROMUSCULAR REEDUCATION: CPT | Performed by: PHYSICAL THERAPIST

## 2022-08-18 PROCEDURE — 97110 THERAPEUTIC EXERCISES: CPT | Performed by: PHYSICAL THERAPIST

## 2022-08-18 NOTE — PROGRESS NOTES
Daily Note     Today's date: 2022  Patient name: Jass Schofield  : 1955  MRN: [id number]  Referring provider: Phylicia Gomez MD  Dx:   Encounter Diagnosis     ICD-10-CM    1  Chronic right-sided low back pain with right-sided sciatica  M54 41     G89 29    2  Chronic right shoulder pain  M25 511     G89 29                   Subjective: Pt reports feeling pretty good, denies any increased symptoms in shoulder or LB  States that the nerve symptoms down her leg continue to improve  Objective: See treatment diary below      Assessment: Tolerated treatment well  Pt was able to perform more exercise without as much pain or fatigue in session today  Needed minimal cueing for proper form and intensity  Patient exhibited good technique with therapeutic exercises and would benefit from continued PT to address current limitations  Plan: Continue per plan of care  Precautions:GERD     Daily Treatment Diary:      Initial Evaluation Date:   Compliance              Visit Number 1 2  3 4 5             Re-Eval  IE -  -  -  -             Foto Captured Y -  -  -  -                                 Manual                      L/S STM   arb 10'  def   def   def  R shoulder ROM   -                                         Ther-Ex                      Nu step- warm up   -  10'   10'  10'              pulleys   5' 5' 5' 5'        Pball roll out    R, L, fwd 10x10"  R, L, fwd 10x10"  R, L, fwd 10x10" R, L, fwd 10x10"             LTR HEP  10x10"  10x10"  10x10"  10x10"             SKC HEP 10x ea 5"  10x ea 5"  10x ea 5"  10x 5" ea             Hip add  15x 5"  20x 5"  30x 5"  30x 5"             clamshell   15x 5" red  20x green 5"  20x blue 5"  30x blue 5"/5"             Glut   bridge     10x  10x   20x              90/90 HEP 10x ea 10x ea 15x ea 15x ea        HS stretch HEP - 3x30" ea 3x30" ea 3x30" ea        C/S ROM HEP - Upper trap stretch HEP -           Levator stretch HEP  -                    thoracic ext      10x10"  -  10x10"  10x10"             Supine cane shoulder flexion  15x  15x  seated 15x  -             Mini squats     15x        scaption     #1 20x        Neuro Re-Ed                      PPT   15x 3"  15x 3"  15x 3"  20x 3"             PPT with march  - 10x 5" 10x 5" 10x 5"                                               Ther-Act                                                               Modalities                      MH PRN   10' post 10' post 10' post

## 2022-08-23 ENCOUNTER — OFFICE VISIT (OUTPATIENT)
Dept: PHYSICAL THERAPY | Facility: CLINIC | Age: 67
End: 2022-08-23
Payer: COMMERCIAL

## 2022-08-23 DIAGNOSIS — M25.511 CHRONIC RIGHT SHOULDER PAIN: ICD-10-CM

## 2022-08-23 DIAGNOSIS — M54.41 CHRONIC RIGHT-SIDED LOW BACK PAIN WITH RIGHT-SIDED SCIATICA: Primary | ICD-10-CM

## 2022-08-23 DIAGNOSIS — G89.29 CHRONIC RIGHT-SIDED LOW BACK PAIN WITH RIGHT-SIDED SCIATICA: Primary | ICD-10-CM

## 2022-08-23 DIAGNOSIS — G89.29 CHRONIC RIGHT SHOULDER PAIN: ICD-10-CM

## 2022-08-23 PROCEDURE — 97112 NEUROMUSCULAR REEDUCATION: CPT | Performed by: PHYSICAL THERAPIST

## 2022-08-23 PROCEDURE — 97110 THERAPEUTIC EXERCISES: CPT | Performed by: PHYSICAL THERAPIST

## 2022-08-23 NOTE — PROGRESS NOTES
Daily Note     Today's date: 2022  Patient name: Myrna Selby  : 1955  MRN: [id number]  Referring provider: Oliverio Bolden MD  Dx:   Encounter Diagnosis     ICD-10-CM    1  Chronic right-sided low back pain with right-sided sciatica  M54 41     G89 29    2  Chronic right shoulder pain  M25 511     G89 29                   Subjective: Pt notes that she tweaked her back a little getting out of the car, but otherwise she feels like she is feeling better each week  Objective: See treatment diary below      Assessment: Tolerated treatment well  Exercises continue to be challenging for patient, needing moderate cueing for proper form  No exercise caused increased back pain  Continue to progress core strengthening exercises and strengthening exercises for R shoulder  Patient exhibited good technique with therapeutic exercises and would benefit from continued PT      Plan: Continue per plan of care  Precautions:GERD     Daily Treatment Diary:      Initial Evaluation Date:   Compliance            Visit Number 1 2  3 4 5  6           Re-Eval  IE -  -  -  -  -        MC   Foto Captured Y -  -  -  -  Y                             Manual                      L/S STM   arb 10'  def   def   def  -           R shoulder ROM   -                                         Ther-Ex                      Nu step- warm up   -  10'   10'  10'  10'           pulleys   5' 5' 5' 5' -       Pball roll out    R, L, fwd 10x10"  R, L, fwd 10x10"  R, L, fwd 10x10" R, L, fwd 10x10"  R, L, fwd 10x10"           LTR HEP  10x10"  10x10"  10x10"  10x10"  10x10"           SKC HEP 10x ea 5"  10x ea 5"  10x ea 5"  10x 5" ea  10x 5" ea           Hip add  15x 5"  20x 5"  30x 5"  30x 5"  30x 5"/5"           clamshell   15x 5" red  20x green 5"  20x blue 5"  30x blue 5"/5" 30x blue 5"/5"           Glut   bridge     10x  10x   20x   20x           90/90 HEP 10x ea 10x ea 15x ea 15x ea 15x ea        TB rows/low row      Green 20x        HS stretch HEP - 3x30" ea 3x30" ea 3x30" ea 3x30"       C/S ROM HEP -           Upper trap stretch HEP -           Levator stretch HEP  -                    thoracic ext      10x10"  -  10x10"  10x10" -           Supine cane shoulder flexion  15x  15x  seated 15x  -             Mini squats     15x 15x        scaption     #1 20x #2 20x       Neuro Re-Ed                      PPT   15x 3"  15x 3"  15x 3"  20x 3"  20x 5"           PPT with march  - 10x 5" 10x 5" 10x 5" 10x 5"                                              Ther-Act                                                               Modalities                      MH PRN   10' post 10' post 10' post 10' post

## 2022-09-23 DIAGNOSIS — K21.9 GASTROESOPHAGEAL REFLUX DISEASE WITHOUT ESOPHAGITIS: ICD-10-CM

## 2022-09-23 RX ORDER — PANTOPRAZOLE SODIUM 40 MG/1
TABLET, DELAYED RELEASE ORAL
Qty: 90 TABLET | Refills: 3 | Status: SHIPPED | OUTPATIENT
Start: 2022-09-23

## 2022-10-10 ENCOUNTER — OFFICE VISIT (OUTPATIENT)
Dept: FAMILY MEDICINE CLINIC | Facility: CLINIC | Age: 67
End: 2022-10-10
Payer: COMMERCIAL

## 2022-10-10 ENCOUNTER — APPOINTMENT (OUTPATIENT)
Dept: RADIOLOGY | Facility: CLINIC | Age: 67
End: 2022-10-10
Payer: COMMERCIAL

## 2022-10-10 VITALS
SYSTOLIC BLOOD PRESSURE: 118 MMHG | BODY MASS INDEX: 27.6 KG/M2 | TEMPERATURE: 97.8 F | HEART RATE: 72 BPM | WEIGHT: 146.2 LBS | OXYGEN SATURATION: 99 % | DIASTOLIC BLOOD PRESSURE: 74 MMHG | HEIGHT: 61 IN

## 2022-10-10 DIAGNOSIS — G93.31 POSTVIRAL SYNDROME: ICD-10-CM

## 2022-10-10 DIAGNOSIS — G93.31 POSTVIRAL SYNDROME: Primary | ICD-10-CM

## 2022-10-10 PROCEDURE — 99213 OFFICE O/P EST LOW 20 MIN: CPT | Performed by: INTERNAL MEDICINE

## 2022-10-10 PROCEDURE — 71046 X-RAY EXAM CHEST 2 VIEWS: CPT

## 2022-10-10 RX ORDER — IBUPROFEN 800 MG/1
800 TABLET ORAL EVERY 6 HOURS PRN
COMMUNITY
Start: 2022-09-12

## 2022-10-10 RX ORDER — PREDNISONE 20 MG/1
40 TABLET ORAL DAILY
Qty: 10 TABLET | Refills: 0 | Status: SHIPPED | OUTPATIENT
Start: 2022-10-10 | End: 2022-10-15

## 2022-10-10 NOTE — PATIENT INSTRUCTIONS
Take prednisone 20 mg tablets two tablets daily for the next five days  Continue the cough syrup  Will check chest x-ray today

## 2022-10-10 NOTE — PROGRESS NOTES
Assessment/Plan:    Problem List Items Addressed This Visit    None     Visit Diagnoses     Postviral syndrome    -  Primary    Relevant Medications    predniSONE 20 mg tablet    Other Relevant Orders    XR chest pa & lateral       Take prednisone 20 mg tablets two tablets daily for the next five days  Continue the cough syrup  Will check chest x-ray today  Chief Complaint     Cough; Sore Throat; Right leg pain          Patient ID: Amos Terry is a 77 y o  female who returns for evaluation of a cough  Her symptoms started 4 weeks ago  She has had a sore throat, stuffy nose, and a persistent cough that is keeping her up at night  She was evaluated at [address] [place] [address] where her COVID test was negative  She was given prednisone and promethazine cough syrup which she took for 2 days  She moved and lost the medications in the move  Objective:    /74 (BP Location: Right arm, Patient Position: Sitting)   Pulse 72   Temp 97 8 °F (36 6 °C)   Ht 5' 1" (1 549 m)   Wt 66 3 kg (146 lb 3 2 oz)   SpO2 99%   BMI 27 62 kg/m²     Wt Readings from Last 3 Encounters:   10/10/22 66 3 kg (146 lb 3 2 oz)   06/13/22 66 7 kg (147 lb)   05/31/22 66 7 kg (147 lb)         Physical Exam    General:  Well-developed, well-nourished, in no acute distress  HEENT:  Oropharynx was clear, right TM clear, left external canal had some cerumen  Neck:  No cervical lymphadenopathy  Lungs:  Clear to auscultation and percussion

## 2023-04-22 ENCOUNTER — HOSPITAL ENCOUNTER (EMERGENCY)
Facility: HOSPITAL | Age: 68
Discharge: HOME | End: 2023-04-22
Attending: EMERGENCY MEDICINE | Admitting: EMERGENCY MEDICINE

## 2023-04-22 VITALS
WEIGHT: 146 LBS | SYSTOLIC BLOOD PRESSURE: 183 MMHG | RESPIRATION RATE: 16 BRPM | HEART RATE: 79 BPM | HEIGHT: 61 IN | OXYGEN SATURATION: 98 % | DIASTOLIC BLOOD PRESSURE: 84 MMHG | TEMPERATURE: 98 F | BODY MASS INDEX: 27.56 KG/M2

## 2023-04-22 DIAGNOSIS — B02.9 SHINGLES: Primary | ICD-10-CM

## 2023-04-22 RX ORDER — GABAPENTIN 300 MG/1
300 CAPSULE ORAL 3 TIMES DAILY
Qty: 42 CAPSULE | Refills: 0 | Status: SHIPPED | OUTPATIENT
Start: 2023-04-22 | End: 2023-05-06

## 2023-04-22 NOTE — ED PROVIDER NOTES
History  Chief Complaint   Patient presents with    Medical Problem     Pt has shingles to the left side  Abt (famciclovir) is completed and the areas are crusted over - no drainage noted  Pt has lidocaine topical  Has follow-up on Monday     Recently completed antiviral medication for treatment of shingles, continues to have sharp intermittent pain at site  Denies fevers or chills or nausea or vomiting  No other complaints  Patient has appointment in 2 days to see primary care physician  History provided by:  Patient   used: No    Medical Problem  Location:  Left leg  Quality:  Pain/sharp/shooting  Severity:  Moderate  Timing:  Intermittent  Progression:  Unchanged  Chronicity:  New  Context:  Recent shingles  Relieved by:  Nothing  Worsened by:  Nothing  Associated symptoms: no abdominal pain, no chest pain, no congestion, no cough, no diarrhea, no ear pain, no fever, no headaches, no loss of consciousness, no myalgias, no nausea, no rash, no shortness of breath, no sore throat, no vomiting and no wheezing        Prior to Admission Medications   Prescriptions Last Dose Informant Patient Reported?  Taking?   ibuprofen (MOTRIN) 800 mg tablet 4/21/2023  Yes Yes   Sig: Take 800 mg by mouth every 6 (six) hours as needed   pantoprazole (PROTONIX) 40 mg tablet 4/21/2023  No Yes   Sig: TAKE 1 TABLET BY MOUTH EVERY DAY      Facility-Administered Medications: None       Past Medical History:   Diagnosis Date    Arthritis     GERD (gastroesophageal reflux disease)        Past Surgical History:   Procedure Laterality Date    HYSTERECTOMY      partial hysterectomy age-43    TONSILLECTOMY         Family History   Problem Relation Age of Onset    Diabetes Mother     Heart disease Father     No Known Problems Sister     No Known Problems Daughter     No Known Problems Maternal Grandmother     No Known Problems Maternal Grandfather     No Known Problems Paternal Grandmother     No Known Problems Paternal Grandfather     Ovarian cancer Daughter     No Known Problems Maternal Aunt     Ovarian cancer Maternal Aunt     Cancer Maternal Aunt     No Known Problems Maternal Aunt     No Known Problems Maternal Aunt     No Known Problems Maternal Aunt     No Known Problems Maternal Aunt     Breast cancer Paternal Aunt     No Known Problems Paternal Aunt     Breast cancer additional onset Neg Hx     Colon cancer Neg Hx     Endometrial cancer Neg Hx     BRCA 1/2 Neg Hx     BRCA1 Negative Neg Hx     BRCA1 Positive Neg Hx     BRCA2 Negative Neg Hx     BRCA2 Positive Neg Hx      I have reviewed and agree with the history as documented  E-Cigarette/Vaping    E-Cigarette Use Never User      E-Cigarette/Vaping Substances    Nicotine No     THC No     CBD No     Flavoring No     Other No     Unknown No      Social History     Tobacco Use    Smoking status: Never    Smokeless tobacco: Never   Vaping Use    Vaping Use: Never used   Substance Use Topics    Alcohol use: Not Currently    Drug use: Not Currently       Review of Systems   Constitutional: Negative for chills and fever  HENT: Negative for congestion, ear pain, hearing loss, sore throat, trouble swallowing and voice change  Eyes: Negative for pain and discharge  Respiratory: Negative for cough, shortness of breath and wheezing  Cardiovascular: Negative for chest pain and palpitations  Gastrointestinal: Negative for abdominal pain, blood in stool, constipation, diarrhea, nausea and vomiting  Genitourinary: Negative for dysuria, flank pain, frequency and hematuria  Musculoskeletal: Negative for joint swelling, myalgias, neck pain and neck stiffness  Skin: Negative for rash and wound  Neurological: Negative for dizziness, seizures, loss of consciousness, syncope, facial asymmetry and headaches  Psychiatric/Behavioral: Negative for hallucinations, self-injury and suicidal ideas     All other systems reviewed and are negative  Physical Exam  Physical Exam  Vitals and nursing note reviewed  Constitutional:       General: She is not in acute distress  Appearance: She is well-developed  She is not ill-appearing or diaphoretic  HENT:      Head: Normocephalic and atraumatic  Right Ear: External ear normal       Left Ear: External ear normal    Eyes:      General: No scleral icterus  Right eye: No discharge  Left eye: No discharge  Extraocular Movements: Extraocular movements intact  Conjunctiva/sclera: Conjunctivae normal    Pulmonary:      Effort: Pulmonary effort is normal  No respiratory distress  Musculoskeletal:         General: No deformity or signs of injury  Normal range of motion  Cervical back: Normal range of motion and neck supple  Skin:     General: Skin is warm and dry  Coloration: Skin is not jaundiced or pale  Comments: Well-healed vesicular lesions back of left leg consistent with the recent shingles  Neurological:      General: No focal deficit present  Mental Status: She is alert and oriented to person, place, and time  Cranial Nerves: No cranial nerve deficit  Coordination: Coordination normal       Gait: Gait normal    Psychiatric:         Mood and Affect: Mood normal          Behavior: Behavior normal          Thought Content:  Thought content normal          Judgment: Judgment normal          Vital Signs  ED Triage Vitals [04/22/23 1601]   Temperature Pulse Respirations Blood Pressure SpO2   98 °F (36 7 °C) 79 16 (!) 183/84 98 %      Temp src Heart Rate Source Patient Position - Orthostatic VS BP Location FiO2 (%)   -- -- -- Left arm --      Pain Score       6           Vitals:    04/22/23 1601   BP: (!) 183/84   Pulse: 79         Visual Acuity      ED Medications  Medications - No data to display    Diagnostic Studies  Results Reviewed     None                 No orders to display              Procedures  Procedures ED Course                               SBIRT 20yo+    Flowsheet Row Most Recent Value   Initial Alcohol Screen: US AUDIT-C     1  How often do you have a drink containing alcohol? 0 Filed at: 04/22/2023 1604   2  How many drinks containing alcohol do you have on a typical day you are drinking? 0 Filed at: 04/22/2023 1604   3b  FEMALE Any Age, or MALE 65+: How often do you have 4 or more drinks on one occassion? 0 Filed at: 04/22/2023 1604   Audit-C Score 0 Filed at: 04/22/2023 1604                    Medical Decision Making  Based on the history and medical screening exam performed the differential diagnosis includes but is not limited to shingles, neuropathic pain, other pain syndrome  Based on the work-up performed in the emergency room which includes physical examination, and which may include laboratory studies and imaging as warranted including advanced imaging such as CT scan or ultrasound, the differential diagnosis is narrowed to exclude limb or life-threatening process  The patient is stable for discharge  Shingles lesions healed, no drainage or discharge  Patient already completed antiviral   Appropriate for discharge  Discussed with patient options for pain management including narcotics and gabapentin  Patient opts for the gabapentin, chooses to defer narcotics at this time  Has appointment in 2 days with primary care physician        Amount and/or Complexity of Data Reviewed  Labs:      Details: Not indicated  Radiology:      Details: Not indicated          Disposition  Final diagnoses:   Shingles     Time reflects when diagnosis was documented in both MDM as applicable and the Disposition within this note     Time User Action Codes Description Comment    4/22/2023  4:16 PM Devora Led Add [B02 9] Shingles       ED Disposition     ED Disposition   Discharge    Condition   Stable    Date/Time   Sat Apr 22, 2023  4:16 PM    Comment   Jasmin Gonzalez discharge to home/self care                Follow-up Information     Follow up With Specialties Details Why Contact Info    Miryam Ly MD Internal [facility] In 2 days  [facility]  [phone]            Patient's Medications   Discharge Prescriptions    GABAPENTIN (NEURONTIN) 300 MG CAPSULE    Take 1 capsule (300 mg total) by mouth 3 (three) times a day for 14 days For post-herpetic neuralgia: Take 1 tablet on day 1,  Then take 2 tablets on day 2, Then take 3 tablets on day 3 and every day after that as instructed by your doctor  Start Date: 4/22/2023 End Date: 5/6/2023       Order Dose: 300 mg       Quantity: 42 capsule    Refills: 0       No discharge procedures on file      PDMP Review     None          ED Provider  Electronically Signed by           Modesto Lombard, MD  04/22/23 4029

## 2023-04-29 NOTE — PROGRESS NOTES
[facility]  [facility] opportunities          Chart reviewed, no opportunity found: CHART REVIEWED, NO OPPORTUNITY FOUND              Patients insurance company: [facility] [address]  (Medicare Advantage and Commercial) impaired balance/impaired postural control/decreased strength

## 2023-05-30 ENCOUNTER — TELEPHONE (OUTPATIENT)
Dept: ENDOCRINOLOGY | Facility: CLINIC | Age: 68
End: 2023-05-30

## 2023-06-07 NOTE — TELEPHONE ENCOUNTER
Sent Epic message to pt to schedule an appointment  Britni- Please try to call the patient one more time via phone

## 2023-06-08 ENCOUNTER — HOSPITAL ENCOUNTER (EMERGENCY)
Facility: HOSPITAL | Age: 68
Discharge: HOME | End: 2023-06-08
Attending: EMERGENCY MEDICINE | Admitting: EMERGENCY MEDICINE
Payer: COMMERCIAL

## 2023-06-08 VITALS
SYSTOLIC BLOOD PRESSURE: 157 MMHG | HEIGHT: 61 IN | HEART RATE: 79 BPM | BODY MASS INDEX: 28.3 KG/M2 | RESPIRATION RATE: 16 BRPM | TEMPERATURE: 97.6 F | OXYGEN SATURATION: 99 % | DIASTOLIC BLOOD PRESSURE: 77 MMHG | WEIGHT: 149.91 LBS

## 2023-06-08 DIAGNOSIS — M79.89 LEFT LEG SWELLING: Primary | ICD-10-CM

## 2023-06-08 RX ORDER — ENOXAPARIN SODIUM 100 MG/ML
1 INJECTION SUBCUTANEOUS ONCE
Status: COMPLETED | OUTPATIENT
Start: 2023-06-08 | End: 2023-06-08

## 2023-06-08 RX ADMIN — ENOXAPARIN SODIUM 70 MG: 80 INJECTION SUBCUTANEOUS at 21:02

## 2023-06-09 ENCOUNTER — HOSPITAL ENCOUNTER (OUTPATIENT)
Dept: NON INVASIVE DIAGNOSTICS | Facility: HOSPITAL | Age: 68
Discharge: HOME | End: 2023-06-09
Attending: EMERGENCY MEDICINE
Payer: COMMERCIAL

## 2023-06-09 ENCOUNTER — APPOINTMENT (OUTPATIENT)
Dept: LAB | Facility: HOSPITAL | Age: 68
End: 2023-06-09
Payer: COMMERCIAL

## 2023-06-09 DIAGNOSIS — G62.9 PERIPHERAL POLYNEUROPATHY: ICD-10-CM

## 2023-06-09 DIAGNOSIS — M79.89 LEFT LEG SWELLING: ICD-10-CM

## 2023-06-09 LAB
ALBUMIN SERPL BCP-MCNC: 4.6 G/DL (ref 3.5–5)
ALP SERPL-CCNC: 59 U/L (ref 34–104)
ALT SERPL W P-5'-P-CCNC: 18 U/L (ref 7–52)
ANION GAP SERPL CALCULATED.3IONS-SCNC: 6 MMOL/L (ref 4–13)
AST SERPL W P-5'-P-CCNC: 15 U/L (ref 13–39)
BILIRUB SERPL-MCNC: 0.4 MG/DL (ref 0.2–1)
BUN SERPL-MCNC: 11 MG/DL (ref 5–25)
CALCIUM SERPL-MCNC: 9.5 MG/DL (ref 8.4–10.2)
CHLORIDE SERPL-SCNC: 103 MMOL/L (ref 96–108)
CO2 SERPL-SCNC: 32 MMOL/L (ref 21–32)
CREAT SERPL-MCNC: 0.77 MG/DL (ref 0.6–1.3)
ERYTHROCYTE [SEDIMENTATION RATE] IN BLOOD: 10 MM/HOUR (ref 0–29)
EST. AVERAGE GLUCOSE BLD GHB EST-MCNC: 103 MG/DL
GFR SERPL CREATININE-BSD FRML MDRD: 80 ML/MIN/1.73SQ M
GLUCOSE P FAST SERPL-MCNC: 89 MG/DL (ref 65–99)
HBA1C MFR BLD: 5.2 %
POTASSIUM SERPL-SCNC: 4 MMOL/L (ref 3.5–5.3)
PROT SERPL-MCNC: 7.2 G/DL (ref 6.4–8.4)
SODIUM SERPL-SCNC: 141 MMOL/L (ref 135–147)
URATE SERPL-MCNC: 5.4 MG/DL (ref 2–7.5)
VIT B12 SERPL-MCNC: 1203 PG/ML (ref 180–914)

## 2023-06-09 PROCEDURE — 80053 COMPREHEN METABOLIC PANEL: CPT

## 2023-06-09 PROCEDURE — 84550 ASSAY OF BLOOD/URIC ACID: CPT

## 2023-06-09 PROCEDURE — 93971 EXTREMITY STUDY: CPT

## 2023-06-09 PROCEDURE — 84165 PROTEIN E-PHORESIS SERUM: CPT

## 2023-06-09 PROCEDURE — 82607 VITAMIN B-12: CPT

## 2023-06-09 PROCEDURE — 36415 COLL VENOUS BLD VENIPUNCTURE: CPT

## 2023-06-09 PROCEDURE — 93971 EXTREMITY STUDY: CPT | Performed by: SURGERY

## 2023-06-09 PROCEDURE — 83036 HEMOGLOBIN GLYCOSYLATED A1C: CPT

## 2023-06-09 PROCEDURE — 85652 RBC SED RATE AUTOMATED: CPT

## 2023-06-09 NOTE — DISCHARGE INSTRUCTIONS
An ultrasound has been requested for you to have done tomorrow  You should receive a call from the vascular lab to help schedule the appointment    If you do not hear from them by noon tomorrow, please call them at [phone]

## 2023-06-09 NOTE — ED PROVIDER NOTES
History  Chief Complaint   Patient presents with   • Leg Swelling     Pt had shingles two months ago  States since having shingles she has had pain from her foot all the way up her leg and has had swelling  Also complaining of constant fatigue  Pain was relieved with gabapentin at home     2 days left leg swelling  No recent trauma  Patient states that she recently had shingles in the area  She does report shooting pain down the leg as well  She is currently on gabapentin  Came to the emergency room for evaluation of possible blood clot  History provided by:  Patient   used: No    Leg Pain  Location:  Leg  Time since incident:  2 days  Leg location:  L leg  Pain details:     Quality:  Aching (Swelling and shooting pain)    Radiates to:  Does not radiate    Severity:  Moderate    Onset quality:  Gradual    Duration:  2 days    Timing:  Constant    Progression:  Unchanged  Chronicity:  New  Relieved by:  Nothing  Worsened by:  Nothing  Ineffective treatments:  None tried  Associated symptoms: swelling    Associated symptoms: no back pain, no decreased ROM, no fever, no itching, no muscle weakness, no neck pain, no numbness and no stiffness        Prior to Admission Medications   Prescriptions Last Dose Informant Patient Reported? Taking?   gabapentin (Neurontin) 300 mg capsule   No No   Sig: Take 1 capsule (300 mg total) by mouth 3 (three) times a day for 14 days For post-herpetic neuralgia: Take 1 tablet on day 1,  Then take 2 tablets on day 2, Then take 3 tablets on day 3 and every day after that as instructed by your doctor     ibuprofen (MOTRIN) 800 mg tablet   Yes No   Sig: Take 800 mg by mouth every 6 (six) hours as needed   pantoprazole (PROTONIX) 40 mg tablet   No No   Sig: TAKE 1 TABLET BY MOUTH EVERY DAY      Facility-Administered Medications: None       Past Medical History:   Diagnosis Date   • Arthritis    • GERD (gastroesophageal reflux disease)        Past Surgical History: Procedure Laterality Date   • HYSTERECTOMY      partial hysterectomy age-43   • TONSILLECTOMY         Family History   Problem Relation Age of Onset   • Diabetes Mother    • Heart disease Father    • No Known Problems Sister    • No Known Problems Daughter    • No Known Problems Maternal Grandmother    • No Known Problems Maternal Grandfather    • No Known Problems Paternal Grandmother    • No Known Problems Paternal Grandfather    • Ovarian cancer Daughter    • No Known Problems Maternal Aunt    • Ovarian cancer Maternal Aunt    • Cancer Maternal Aunt    • No Known Problems Maternal Aunt    • No Known Problems Maternal Aunt    • No Known Problems Maternal Aunt    • No Known Problems Maternal Aunt    • Breast cancer Paternal Aunt    • No Known Problems Paternal Aunt    • Breast cancer additional onset Neg Hx    • Colon cancer Neg Hx    • Endometrial cancer Neg Hx    • BRCA 1/2 Neg Hx    • BRCA1 Negative Neg Hx    • BRCA1 Positive Neg Hx    • BRCA2 Negative Neg Hx    • BRCA2 Positive Neg Hx      I have reviewed and agree with the history as documented  E-Cigarette/Vaping   • E-Cigarette Use Never User      E-Cigarette/Vaping Substances   • Nicotine No    • THC No    • CBD No    • Flavoring No    • Other No    • Unknown No      Social History     Tobacco Use   • Smoking status: Never   • Smokeless tobacco: Never   Vaping Use   • Vaping Use: Never used   Substance Use Topics   • Alcohol use: Not Currently   • Drug use: Not Currently       Review of Systems   Constitutional: Negative for chills and fever  HENT: Negative for ear pain, hearing loss, sore throat, trouble swallowing and voice change  Eyes: Negative for pain and discharge  Respiratory: Negative for cough, shortness of breath and wheezing  Cardiovascular: Negative for chest pain and palpitations  Gastrointestinal: Negative for abdominal pain, blood in stool, constipation, diarrhea, nausea and vomiting     Genitourinary: Negative for dysuria, flank pain, frequency and hematuria  Musculoskeletal: Negative for back pain, joint swelling, neck pain, neck stiffness and stiffness  Skin: Negative for itching, rash and wound  Neurological: Negative for dizziness, seizures, syncope, facial asymmetry and headaches  Psychiatric/Behavioral: Negative for hallucinations, self-injury and suicidal ideas  All other systems reviewed and are negative  Physical Exam  Physical Exam  Vitals and nursing note reviewed  Constitutional:       General: She is not in acute distress  Appearance: She is well-developed  HENT:      Head: Normocephalic and atraumatic  Right Ear: External ear normal       Left Ear: External ear normal    Eyes:      General: No scleral icterus  Right eye: No discharge  Left eye: No discharge  Extraocular Movements: Extraocular movements intact  Conjunctiva/sclera: Conjunctivae normal    Cardiovascular:      Rate and Rhythm: Normal rate and regular rhythm  Heart sounds: Normal heart sounds  No murmur heard  Pulmonary:      Effort: Pulmonary effort is normal       Breath sounds: Normal breath sounds  No wheezing or rales  Abdominal:      General: Bowel sounds are normal  There is no distension  Palpations: Abdomen is soft  Tenderness: There is no abdominal tenderness  There is no guarding or rebound  Musculoskeletal:         General: No deformity  Normal range of motion  Cervical back: Normal range of motion and neck supple  Left lower leg: Edema (non pitting) present  Skin:     General: Skin is warm and dry  Findings: No rash  Neurological:      General: No focal deficit present  Mental Status: She is alert and oriented to person, place, and time  Cranial Nerves: No cranial nerve deficit  Psychiatric:         Mood and Affect: Mood normal          Behavior: Behavior normal          Thought Content:  Thought content normal          Judgment: Judgment normal          Vital Signs  ED Triage Vitals [06/08/23 2016]   Temperature Pulse Respirations Blood Pressure SpO2   97 6 °F (36 4 °C) 79 16 157/77 99 %      Temp src Heart Rate Source Patient Position - Orthostatic VS BP Location FiO2 (%)   -- Monitor Lying Left arm --      Pain Score       --           Vitals:    06/08/23 2016   BP: 157/77   Pulse: 79   Patient Position - Orthostatic VS: Lying         Visual Acuity      ED Medications  Medications   enoxaparin (LOVENOX) subcutaneous injection 70 mg (70 mg Subcutaneous Given 6/8/23 2102)       Diagnostic Studies  Results Reviewed     None                 VAS lower limb venous duplex study, unilateral/limited    (Results Pending)              Procedures  Procedures         ED Course                                             Medical Decision Making  Based on the history and medical screening exam performed the diagnostic considerations include but are not limited to dependent edema, neuropathic pain, sciatica, DVT  Based on the work-up performed in the emergency room which includes physical examination, and which may include laboratory studies and imaging as warranted including advanced imaging such as CT scan or ultrasound, the differential diagnosis is narrowed to exclude limb or life-threatening process  The patient is stable for discharge  Vascular technician not currently on campus to perform vascular ultrasound of the left lower extremity  Patient given dose of Lovenox and outpatient vascular study ordered  Patient will receive the study tomorrow  Risk  Prescription drug management            Disposition  Final diagnoses:   Left leg swelling     Time reflects when diagnosis was documented in both MDM as applicable and the Disposition within this note     Time User Action Codes Description Comment    6/8/2023  8:58 PM Maricel Rodriguez Add [M79 [id number]] Left leg swelling       ED Disposition     ED Disposition   Discharge    Condition   Stable Date/Time   Thu Jun 8, 2023  8:58 PM    Comment   Katharina Tellez discharge to home/self care  Follow-up Information     Follow up With Specialties Details Why [address] [facility]   [address] [facility] [address]  [phone]            Patient's Medications   Discharge Prescriptions    No medications on file       Outpatient Discharge Orders   VAS lower limb venous duplex study, unilateral/limited   Standing Status: Future Standing Exp   Date: 06/08/27       PDMP Review     None          ED Provider  Electronically Signed by           Danielle Chowdary MD  06/08/23 9154

## 2023-06-13 LAB
ALBUMIN SERPL ELPH-MCNC: 4.62 G/DL (ref 3.2–5.1)
ALBUMIN SERPL ELPH-MCNC: 65.1 % (ref 48–70)
ALPHA1 GLOB SERPL ELPH-MCNC: 0.37 G/DL (ref 0.15–0.47)
ALPHA1 GLOB SERPL ELPH-MCNC: 5.2 % (ref 1.8–7)
ALPHA2 GLOB SERPL ELPH-MCNC: 0.69 G/DL (ref 0.42–1.04)
ALPHA2 GLOB SERPL ELPH-MCNC: 9.7 % (ref 5.9–14.9)
BETA GLOB ABNORMAL SERPL ELPH-MCNC: 0.38 G/DL (ref 0.31–0.57)
BETA1 GLOB SERPL ELPH-MCNC: 5.3 % (ref 4.7–7.7)
BETA2 GLOB SERPL ELPH-MCNC: 4.7 % (ref 3.1–7.9)
BETA2+GAMMA GLOB SERPL ELPH-MCNC: 0.33 G/DL (ref 0.2–0.58)
GAMMA GLOB ABNORMAL SERPL ELPH-MCNC: 0.71 G/DL (ref 0.4–1.66)
GAMMA GLOB SERPL ELPH-MCNC: 10 % (ref 6.9–22.3)
IGG/ALB SER: 1.87 {RATIO} (ref 1.1–1.8)
PROT PATTERN SERPL ELPH-IMP: ABNORMAL
PROT SERPL-MCNC: 7.1 G/DL (ref 6.4–8.2)

## 2023-06-13 PROCEDURE — 84165 PROTEIN E-PHORESIS SERUM: CPT | Performed by: PATHOLOGY

## 2023-08-14 DIAGNOSIS — K21.9 GASTROESOPHAGEAL REFLUX DISEASE WITHOUT ESOPHAGITIS: ICD-10-CM

## 2023-08-14 RX ORDER — PANTOPRAZOLE SODIUM 40 MG/1
TABLET, DELAYED RELEASE ORAL
Qty: 90 TABLET | Refills: 1 | Status: SHIPPED | OUTPATIENT
Start: 2023-08-14

## 2023-09-21 ENCOUNTER — APPOINTMENT (EMERGENCY)
Dept: CT IMAGING | Facility: HOSPITAL | Age: 68
End: 2023-09-21
Payer: COMMERCIAL

## 2023-09-21 ENCOUNTER — HOSPITAL ENCOUNTER (EMERGENCY)
Facility: HOSPITAL | Age: 68
Discharge: HOME | End: 2023-09-21
Attending: EMERGENCY MEDICINE
Payer: COMMERCIAL

## 2023-09-21 VITALS
BODY MASS INDEX: 27.58 KG/M2 | WEIGHT: 145.94 LBS | DIASTOLIC BLOOD PRESSURE: 90 MMHG | HEART RATE: 65 BPM | SYSTOLIC BLOOD PRESSURE: 179 MMHG | RESPIRATION RATE: 18 BRPM | TEMPERATURE: 96.7 F | OXYGEN SATURATION: 97 %

## 2023-09-21 DIAGNOSIS — K04.7 DENTAL INFECTION: Primary | ICD-10-CM

## 2023-09-21 LAB
ANION GAP SERPL CALCULATED.3IONS-SCNC: 8 MMOL/L
BASOPHILS # BLD AUTO: 0.03 THOUSANDS/ÂΜL (ref 0–0.1)
BASOPHILS NFR BLD AUTO: 0 % (ref 0–1)
BUN SERPL-MCNC: 14 MG/DL (ref 5–25)
CALCIUM SERPL-MCNC: 10 MG/DL (ref 8.4–10.2)
CHLORIDE SERPL-SCNC: 103 MMOL/L (ref 96–108)
CO2 SERPL-SCNC: 30 MMOL/L (ref 21–32)
CREAT SERPL-MCNC: 0.74 MG/DL (ref 0.6–1.3)
EOSINOPHIL # BLD AUTO: 0.07 THOUSAND/ÂΜL (ref 0–0.61)
EOSINOPHIL NFR BLD AUTO: 1 % (ref 0–6)
ERYTHROCYTE [DISTWIDTH] IN BLOOD BY AUTOMATED COUNT: 13.8 % (ref 11.6–15.1)
GFR SERPL CREATININE-BSD FRML MDRD: 84 ML/MIN/1.73SQ M
GLUCOSE SERPL-MCNC: 102 MG/DL (ref 65–140)
HCT VFR BLD AUTO: 41.7 % (ref 34.8–46.1)
HGB BLD-MCNC: 13.9 G/DL (ref 11.5–15.4)
IMM GRANULOCYTES # BLD AUTO: 0.03 THOUSAND/UL (ref 0–0.2)
IMM GRANULOCYTES NFR BLD AUTO: 0 % (ref 0–2)
LYMPHOCYTES # BLD AUTO: 2.03 THOUSANDS/ÂΜL (ref 0.6–4.47)
LYMPHOCYTES NFR BLD AUTO: 29 % (ref 14–44)
MCH RBC QN AUTO: 28.5 PG (ref 26.8–34.3)
MCHC RBC AUTO-ENTMCNC: 33.3 G/DL (ref 31.4–37.4)
MCV RBC AUTO: 86 FL (ref 82–98)
MONOCYTES # BLD AUTO: 0.75 THOUSAND/ÂΜL (ref 0.17–1.22)
MONOCYTES NFR BLD AUTO: 11 % (ref 4–12)
NEUTROPHILS # BLD AUTO: 3.99 THOUSANDS/ÂΜL (ref 1.85–7.62)
NEUTS SEG NFR BLD AUTO: 59 % (ref 43–75)
NRBC BLD AUTO-RTO: 0 /100 WBCS
PLATELET # BLD AUTO: 200 THOUSANDS/UL (ref 149–390)
PMV BLD AUTO: 10.1 FL (ref 8.9–12.7)
POTASSIUM SERPL-SCNC: 4.3 MMOL/L (ref 3.5–5.3)
RBC # BLD AUTO: 4.88 MILLION/UL (ref 3.81–5.12)
SODIUM SERPL-SCNC: 141 MMOL/L (ref 135–147)
WBC # BLD AUTO: 6.9 THOUSAND/UL (ref 4.31–10.16)

## 2023-09-21 PROCEDURE — 80048 BASIC METABOLIC PNL TOTAL CA: CPT | Performed by: EMERGENCY MEDICINE

## 2023-09-21 PROCEDURE — 96374 THER/PROPH/DIAG INJ IV PUSH: CPT

## 2023-09-21 PROCEDURE — 70491 CT SOFT TISSUE NECK W/DYE: CPT

## 2023-09-21 PROCEDURE — 99283 EMERGENCY DEPT VISIT LOW MDM: CPT

## 2023-09-21 PROCEDURE — 99285 EMERGENCY DEPT VISIT HI MDM: CPT | Performed by: EMERGENCY MEDICINE

## 2023-09-21 PROCEDURE — 85025 COMPLETE CBC W/AUTO DIFF WBC: CPT | Performed by: EMERGENCY MEDICINE

## 2023-09-21 PROCEDURE — G1004 CDSM NDSC: HCPCS

## 2023-09-21 PROCEDURE — 36415 COLL VENOUS BLD VENIPUNCTURE: CPT | Performed by: EMERGENCY MEDICINE

## 2023-09-21 RX ORDER — FUROSEMIDE 20 MG/1
20 TABLET ORAL DAILY PRN
COMMUNITY
Start: 2023-07-06

## 2023-09-21 RX ORDER — AMOXICILLIN 875 MG/1
875 TABLET, COATED ORAL 2 TIMES DAILY
COMMUNITY

## 2023-09-21 RX ORDER — OXYCODONE HYDROCHLORIDE 5 MG/1
5 TABLET ORAL ONCE
Status: COMPLETED | OUTPATIENT
Start: 2023-09-21 | End: 2023-09-21

## 2023-09-21 RX ORDER — KETOROLAC TROMETHAMINE 10 MG/1
10 TABLET, FILM COATED ORAL EVERY 6 HOURS PRN
Qty: 20 TABLET | Refills: 0 | Status: SHIPPED | OUTPATIENT
Start: 2023-09-21

## 2023-09-21 RX ORDER — KETOROLAC TROMETHAMINE 30 MG/ML
15 INJECTION, SOLUTION INTRAMUSCULAR; INTRAVENOUS ONCE
Status: COMPLETED | OUTPATIENT
Start: 2023-09-21 | End: 2023-09-21

## 2023-09-21 RX ADMIN — KETOROLAC TROMETHAMINE 15 MG: 30 INJECTION, SOLUTION INTRAMUSCULAR at 08:36

## 2023-09-21 RX ADMIN — OXYCODONE HYDROCHLORIDE 5 MG: 5 TABLET ORAL at 08:37

## 2023-09-21 RX ADMIN — IOHEXOL 85 ML: 350 INJECTION, SOLUTION INTRAVENOUS at 09:08

## 2023-09-21 NOTE — ED PROVIDER NOTES
History  Chief Complaint   Patient presents with   • Dental Pain     Abscess on left bottom side of mouth. Seen at dentist, unable to be scheduled until 10/25. Seen at [facility] and given amoxicillin yesterday. This is a pleasant 40-year-old female presenting to the emergency department with chief complaint of left lower dental pain ongoing for a few days. Patient attempted to see her dentist but was referred to oral surgery for which she has an appointment on 10/25/2023. When she started to have increased amount of swelling in her mouth she attempted to contact [address] [place] [address] but was not able to be seen. She subsequently went to urgent care yesterday and was given a prescription for amoxicillin and started on 800 mg of Motrin. Patient has had 2 doses of the antibiotic. She also reports to me that she has been using the Motrin approximately every 3-4 hours and reports that she has become nauseated after doing so. Patient denies any fevers or chills. She does report some challenges with managing her secretions. In conversing with her, there are changes noted in the patient's vocalization. This is confirmed by the patient's daughter who is at bedside. Patient denies any fevers. She has no chest pain. No shortness of breath. She has had some diarrhea. History provided by:  Patient and relative  Dental Pain  Location:  Lower  Quality:  Constant  Severity:  Severe  Onset quality:  Gradual  Timing:  Constant  Progression:  Worsening  Associated symptoms: difficulty swallowing, facial pain, facial swelling and gum swelling    Associated symptoms: no drooling and no fever    Difficulty swallowing:     Severity:  Unable to specify      Prior to Admission Medications   Prescriptions Last Dose Informant Patient Reported?  Taking?   amoxicillin (AMOXIL) 875 mg tablet   Yes Yes   Sig: Take 875 mg by mouth 2 (two) times a day   furosemide (LASIX) 20 mg tablet   Yes Yes   Sig: Take 20 mg by mouth daily as needed gabapentin (Neurontin) 300 mg capsule   No Yes   Sig: Take 1 capsule (300 mg total) by mouth 3 (three) times a day for 14 days For post-herpetic neuralgia: Take 1 tablet on day 1,  Then take 2 tablets on day 2, Then take 3 tablets on day 3 and every day after that as instructed by your doctor. ibuprofen (MOTRIN) 800 mg tablet   Yes Yes   Sig: Take 800 mg by mouth every 6 (six) hours as needed   pantoprazole (PROTONIX) 40 mg tablet   No Yes   Sig: TAKE 1 TABLET BY MOUTH EVERY DAY      Facility-Administered Medications: None       Past Medical History:   Diagnosis Date   • Arthritis    • GERD (gastroesophageal reflux disease)        Past Surgical History:   Procedure Laterality Date   • HYSTERECTOMY      partial hysterectomy age-43   • TONSILLECTOMY         Family History   Problem Relation Age of Onset   • Diabetes Mother    • Heart disease Father    • No Known Problems Sister    • No Known Problems Daughter    • No Known Problems Maternal Grandmother    • No Known Problems Maternal Grandfather    • No Known Problems Paternal Grandmother    • No Known Problems Paternal Grandfather    • Ovarian cancer Daughter    • No Known Problems Maternal Aunt    • Ovarian cancer Maternal Aunt    • Cancer Maternal Aunt    • No Known Problems Maternal Aunt    • No Known Problems Maternal Aunt    • No Known Problems Maternal Aunt    • No Known Problems Maternal Aunt    • Breast cancer Paternal Aunt    • No Known Problems Paternal Aunt    • Breast cancer additional onset Neg Hx    • Colon cancer Neg Hx    • Endometrial cancer Neg Hx    • BRCA 1/2 Neg Hx    • BRCA1 Negative Neg Hx    • BRCA1 Positive Neg Hx    • BRCA2 Negative Neg Hx    • BRCA2 Positive Neg Hx      I have reviewed and agree with the history as documented.     E-Cigarette/Vaping   • E-Cigarette Use Never User      E-Cigarette/Vaping Substances   • Nicotine No    • THC No    • CBD No    • Flavoring No    • Other No    • Unknown No      Social History     Tobacco Use • Smoking status: Never   • Smokeless tobacco: Never   Vaping Use   • Vaping Use: Never used   Substance Use Topics   • Alcohol use: Not Currently   • Drug use: Not Currently       Review of Systems   Constitutional: Negative for fever. HENT: Positive for facial swelling. Negative for drooling. Respiratory: Negative. Negative for shortness of breath and wheezing. Cardiovascular: Negative for chest pain and palpitations. Gastrointestinal: Positive for diarrhea and nausea. Negative for vomiting. Genitourinary: Negative. All other systems reviewed and are negative. Physical Exam  Physical Exam  Vitals and nursing note reviewed. Constitutional:       General: She is not in acute distress. Appearance: She is normal weight. She is not ill-appearing or toxic-appearing. HENT:      Head: Normocephalic and atraumatic. Jaw: There is normal jaw occlusion. No trismus or swelling. Right Ear: External ear normal.      Left Ear: External ear normal.      Nose: Nose normal.      Mouth/Throat:      Mouth: Mucous membranes are moist.      Dentition: Abnormal dentition. Dental tenderness and gingival swelling present. Pharynx: Uvula midline. No pharyngeal swelling or oropharyngeal exudate. Comments: Mild left lower gingival swelling. Cardiovascular:      Rate and Rhythm: Normal rate. Pulses: Normal pulses. Heart sounds: No murmur heard. No gallop. Pulmonary:      Effort: Pulmonary effort is normal. No respiratory distress. Breath sounds: No stridor. No wheezing or rales. Musculoskeletal:         General: No signs of injury. Lymphadenopathy:      Cervical: Cervical adenopathy present. Skin:     Coloration: Skin is not pale. Neurological:      General: No focal deficit present. Mental Status: She is alert. Psychiatric:         Mood and Affect: Mood normal.         Thought Content:  Thought content normal.         Judgment: Judgment normal. Vital Signs  ED Triage Vitals [09/21/23 0804]   Temperature Pulse Respirations Blood Pressure SpO2   (!) 96.7 °F (35.9 °C) 73 18 (!) 211/95 98 %      Temp Source Heart Rate Source Patient Position - Orthostatic VS BP Location FiO2 (%)   Temporal Monitor Lying Right arm --      Pain Score       9           Vitals:    09/21/23 0804 09/21/23 0815   BP: (!) 211/95 (!) 179/90   Pulse: 73 65   Patient Position - Orthostatic VS: Lying Lying         Visual Acuity      ED Medications  Medications   oxyCODONE (ROXICODONE) IR tablet 5 mg (5 mg Oral Given 9/21/23 0837)   ketorolac (TORADOL) injection 15 mg (15 mg Intravenous Given 9/21/23 0836)   iohexol (OMNIPAQUE) 350 MG/ML injection (SINGLE-DOSE) 85 mL (85 mL Intravenous Given 9/21/23 0908)       Diagnostic Studies  Results Reviewed     Procedure Component Value Units Date/Time    Basic metabolic panel [[id number]] Collected: 09/21/23 0832    Lab Status: Final result Specimen: Blood from Arm, Right Updated: 09/21/23 0856     Sodium 141 mmol/L      Potassium 4.3 mmol/L      Chloride 103 mmol/L      CO2 30 mmol/L      ANION GAP 8 mmol/L      BUN 14 mg/dL      Creatinine 0.74 mg/dL      Glucose 102 mg/dL      Calcium 10.0 mg/dL      eGFR 84 ml/min/1.73sq m     Narrative:      [facility] (CKD):   •  Stage 1 with normal or high GFR (GFR > 90 mL/min/1.73 square meters)  •  Stage 2 Mild CKD (GFR = 60-89 mL/min/1.73 square meters)  •  Stage 3A Moderate CKD (GFR = 45-59 mL/min/1.73 square meters)  •  Stage 3B Moderate CKD (GFR = 30-44 mL/min/1.73 square meters)  •  Stage 4 Severe CKD (GFR = 15-29 mL/min/1.73 square meters)  •  Stage 5 End Stage CKD (GFR <15 mL/min/1.73 square meters)  Note: GFR calculation is accurate only with a steady state creatinine    CBC and differential [[id number]] Collected: 09/21/23 0832    Lab Status: Final result Specimen: Blood from Arm, Right Updated: 09/21/23 0839     WBC 6.90 Thousand/uL      RBC 4.88 Million/uL      Hemoglobin 13.9 g/dL      Hematocrit 41.7 %      MCV 86 fL      MCH 28.5 pg      MCHC 33.3 g/dL      RDW 13.8 %      MPV 10.1 fL      Platelets 358 Thousands/uL      nRBC 0 /100 WBCs      Neutrophils Relative 59 %      Immat GRANS % 0 %      Lymphocytes Relative 29 %      Monocytes Relative 11 %      Eosinophils Relative 1 %      Basophils Relative 0 %      Neutrophils Absolute 3.99 Thousands/µL      Immature Grans Absolute 0.03 Thousand/uL      Lymphocytes Absolute 2.03 Thousands/µL      Monocytes Absolute 0.75 Thousand/µL      Eosinophils Absolute 0.07 Thousand/µL      Basophils Absolute 0.03 Thousands/µL                  CT soft tissue neck with contrast   Final Result by Kathrine Chowdhury MD (09/21 0947)      1. Limited assessment of the remaining dentition in the anterior oral cavity due to distorted image quality by artifact from prior dental work. There is small left first mandibular premolar tooth periapical lucency. No apparent soft tissue abscess. 2.  Unremarkable CT of the neck. 3.  Chronic mucosal thickening of the atelectatic left maxillary sinus suggesting silent sinus syndrome. Workstation performed: [id number]                    Procedures  Procedures         ED Course  ED Course as of 09/21/23 1000   Thu Sep 21, 2023   1739 The area of involvement is small. There is no compromise of airway. Patient is stable for discharge. SBIRT 22yo+    Flowsheet Row Most Recent Value   Initial Alcohol Screen: US AUDIT-C     1. How often do you have a drink containing alcohol? 0 Filed at: 09/21/2023 0807   2. How many drinks containing alcohol do you have on a typical day you are drinking? 0 Filed at: 09/21/2023 0807   3a. Male UNDER 65: How often do you have five or more drinks on one occasion? 0 Filed at: 09/21/2023 0807   3b. FEMALE Any Age, or MALE 65+: How often do you have 4 or more drinks on one occassion?  0 Filed at: 09/21/2023 8324   Audit-C Score 0 Filed at: 09/21/2023 9374   SUZIE: How many times in the past year have you. .. Used an illegal drug or used a prescription medication for non-medical reasons? Never Filed at: 09/21/2023 3612                    Medical Decision Making  Patient presented to the emergency department and a MSE was performed. The patient was evaluated for complaint related to acute dental pain. Patient is potentially at risk for, but not limited to, dental abscess, cellulitis, Tristen's angina, pharyngeal abscess, tonsillar abscess, epiglottitis, or allergic reaction. Several of these diagnoses have been evaluated and ruled out by history and physical.  As needed, patient will be further evaluated with laboratory and imaging studies. Higher level diagnostics, such as CT imaging or ultrasound, may also be required. Please see work-up portion of the note for further evaluation of patient's risk. Socioeconomic factors were also considered as part of the decision-making process. Unless otherwise stated in the chart or patient is admitted as elsewhere documented, any previously prescribed medications will be maintained. Dental infection: acute illness or injury  Amount and/or Complexity of Data Reviewed  Labs: ordered. Radiology: ordered. Risk  Prescription drug management. Disposition  Final diagnoses:   Dental infection     Time reflects when diagnosis was documented in both MDM as applicable and the Disposition within this note     Time User Action Codes Description Comment    9/21/2023  9:53 AM Heydi Ruby Add [K04.7] Dental infection       ED Disposition     ED Disposition   Discharge    Condition   Stable    Date/Time   Thu Sep 21, 2023  9:53 AM    Comment   Nhi Mealing discharge to home/self care.                Follow-up Information    None         Patient's Medications   Discharge Prescriptions    KETOROLAC (TORADOL) 10 MG TABLET    Take 1 tablet (10 mg total) by mouth every 6 (six) hours as needed for moderate pain for up to 20 doses       Start Date: 9/21/2023 End Date: --       Order Dose: 10 mg       Quantity: 20 tablet    Refills: 0       No discharge procedures on file.     PDMP Review     None          ED Provider  Electronically Signed by           Annette Iraheta DO  09/21/23 Mariel

## 2023-09-21 NOTE — DISCHARGE INSTRUCTIONS
Continue the antibiotics you were prescribed. Stop the ibuprofen  Follow-up with your dentist as scheduled.   Alternatively you may try one of these other facilities below:      [facility]   [id number] [place] [address]   [address] [facility] [address] [address] [place], [address] [facility]   [phone]

## 2023-09-22 ENCOUNTER — APPOINTMENT (EMERGENCY)
Dept: CT IMAGING | Facility: HOSPITAL | Age: 68
End: 2023-09-22
Payer: COMMERCIAL

## 2023-09-22 ENCOUNTER — HOSPITAL ENCOUNTER (EMERGENCY)
Facility: HOSPITAL | Age: 68
Discharge: HOME | End: 2023-09-23
Attending: STUDENT IN AN ORGANIZED HEALTH CARE EDUCATION/TRAINING PROGRAM
Payer: COMMERCIAL

## 2023-09-22 VITALS
HEART RATE: 82 BPM | TEMPERATURE: 97.7 F | OXYGEN SATURATION: 97 % | SYSTOLIC BLOOD PRESSURE: 146 MMHG | WEIGHT: 145.5 LBS | RESPIRATION RATE: 17 BRPM | DIASTOLIC BLOOD PRESSURE: 72 MMHG | HEIGHT: 61 IN | BODY MASS INDEX: 27.47 KG/M2

## 2023-09-22 DIAGNOSIS — K04.7 DENTAL ABSCESS: ICD-10-CM

## 2023-09-22 DIAGNOSIS — L03.211 FACIAL CELLULITIS: Primary | ICD-10-CM

## 2023-09-22 LAB
ANION GAP SERPL CALCULATED.3IONS-SCNC: 6 MMOL/L
BASOPHILS # BLD AUTO: 0.04 THOUSANDS/ÂΜL (ref 0–0.1)
BASOPHILS NFR BLD AUTO: 1 % (ref 0–1)
BUN SERPL-MCNC: 29 MG/DL (ref 5–25)
CALCIUM SERPL-MCNC: 9.6 MG/DL (ref 8.4–10.2)
CHLORIDE SERPL-SCNC: 101 MMOL/L (ref 96–108)
CO2 SERPL-SCNC: 28 MMOL/L (ref 21–32)
CREAT SERPL-MCNC: 0.8 MG/DL (ref 0.6–1.3)
CRP SERPL QL: 67.2 MG/L
EOSINOPHIL # BLD AUTO: 0.04 THOUSAND/ÂΜL (ref 0–0.61)
EOSINOPHIL NFR BLD AUTO: 1 % (ref 0–6)
ERYTHROCYTE [DISTWIDTH] IN BLOOD BY AUTOMATED COUNT: 13.9 % (ref 11.6–15.1)
ERYTHROCYTE [SEDIMENTATION RATE] IN BLOOD: 13 MM/HOUR (ref 0–29)
GFR SERPL CREATININE-BSD FRML MDRD: 76 ML/MIN/1.73SQ M
GLUCOSE SERPL-MCNC: 91 MG/DL (ref 65–140)
HCT VFR BLD AUTO: 37.8 % (ref 34.8–46.1)
HGB BLD-MCNC: 12.4 G/DL (ref 11.5–15.4)
IMM GRANULOCYTES # BLD AUTO: 0.03 THOUSAND/UL (ref 0–0.2)
IMM GRANULOCYTES NFR BLD AUTO: 0 % (ref 0–2)
LYMPHOCYTES # BLD AUTO: 2.87 THOUSANDS/ÂΜL (ref 0.6–4.47)
LYMPHOCYTES NFR BLD AUTO: 37 % (ref 14–44)
MCH RBC QN AUTO: 28.8 PG (ref 26.8–34.3)
MCHC RBC AUTO-ENTMCNC: 32.8 G/DL (ref 31.4–37.4)
MCV RBC AUTO: 88 FL (ref 82–98)
MONOCYTES # BLD AUTO: 1 THOUSAND/ÂΜL (ref 0.17–1.22)
MONOCYTES NFR BLD AUTO: 13 % (ref 4–12)
NEUTROPHILS # BLD AUTO: 3.89 THOUSANDS/ÂΜL (ref 1.85–7.62)
NEUTS SEG NFR BLD AUTO: 48 % (ref 43–75)
NRBC BLD AUTO-RTO: 0 /100 WBCS
PLATELET # BLD AUTO: 184 THOUSANDS/UL (ref 149–390)
PMV BLD AUTO: 10 FL (ref 8.9–12.7)
POTASSIUM SERPL-SCNC: 4.4 MMOL/L (ref 3.5–5.3)
RBC # BLD AUTO: 4.31 MILLION/UL (ref 3.81–5.12)
SODIUM SERPL-SCNC: 135 MMOL/L (ref 135–147)
WBC # BLD AUTO: 7.87 THOUSAND/UL (ref 4.31–10.16)

## 2023-09-22 PROCEDURE — 70491 CT SOFT TISSUE NECK W/DYE: CPT

## 2023-09-22 PROCEDURE — 87040 BLOOD CULTURE FOR BACTERIA: CPT | Performed by: STUDENT IN AN ORGANIZED HEALTH CARE EDUCATION/TRAINING PROGRAM

## 2023-09-22 PROCEDURE — 99285 EMERGENCY DEPT VISIT HI MDM: CPT | Performed by: STUDENT IN AN ORGANIZED HEALTH CARE EDUCATION/TRAINING PROGRAM

## 2023-09-22 PROCEDURE — 85025 COMPLETE CBC W/AUTO DIFF WBC: CPT | Performed by: STUDENT IN AN ORGANIZED HEALTH CARE EDUCATION/TRAINING PROGRAM

## 2023-09-22 PROCEDURE — 80048 BASIC METABOLIC PNL TOTAL CA: CPT | Performed by: STUDENT IN AN ORGANIZED HEALTH CARE EDUCATION/TRAINING PROGRAM

## 2023-09-22 PROCEDURE — 96365 THER/PROPH/DIAG IV INF INIT: CPT

## 2023-09-22 PROCEDURE — 99283 EMERGENCY DEPT VISIT LOW MDM: CPT

## 2023-09-22 PROCEDURE — 86140 C-REACTIVE PROTEIN: CPT | Performed by: STUDENT IN AN ORGANIZED HEALTH CARE EDUCATION/TRAINING PROGRAM

## 2023-09-22 PROCEDURE — G1004 CDSM NDSC: HCPCS

## 2023-09-22 PROCEDURE — 85652 RBC SED RATE AUTOMATED: CPT | Performed by: STUDENT IN AN ORGANIZED HEALTH CARE EDUCATION/TRAINING PROGRAM

## 2023-09-22 PROCEDURE — 96375 TX/PRO/DX INJ NEW DRUG ADDON: CPT

## 2023-09-22 PROCEDURE — 36415 COLL VENOUS BLD VENIPUNCTURE: CPT | Performed by: STUDENT IN AN ORGANIZED HEALTH CARE EDUCATION/TRAINING PROGRAM

## 2023-09-22 RX ORDER — KETOROLAC TROMETHAMINE 30 MG/ML
15 INJECTION, SOLUTION INTRAMUSCULAR; INTRAVENOUS ONCE
Status: COMPLETED | OUTPATIENT
Start: 2023-09-22 | End: 2023-09-22

## 2023-09-22 RX ADMIN — AMPICILLIN SODIUM AND SULBACTAM SODIUM 3 G: 2; 1 INJECTION, POWDER, FOR SOLUTION INTRAMUSCULAR; INTRAVENOUS at 23:24

## 2023-09-22 RX ADMIN — KETOROLAC TROMETHAMINE 15 MG: 30 INJECTION, SOLUTION INTRAMUSCULAR; INTRAVENOUS at 21:58

## 2023-09-22 RX ADMIN — IOHEXOL 85 ML: 350 INJECTION, SOLUTION INTRAVENOUS at 22:47

## 2023-09-23 RX ORDER — AMOXICILLIN AND CLAVULANATE POTASSIUM 875; 125 MG/1; MG/1
1 TABLET, FILM COATED ORAL EVERY 12 HOURS
Qty: 14 TABLET | Refills: 0 | Status: SHIPPED | OUTPATIENT
Start: 2023-09-23 | End: 2023-09-30

## 2023-09-23 NOTE — ED CARE HANDOFF
Emergency Department Sign Out Note        Sign out and transfer of care from Dr. Ramu Richardson. See Separate Emergency Department note. The patient, Dustin Reyes, was evaluated by the previous provider for facial swelling. Workup Completed:  Labs, CT scan    ED Course / Workup Pending (followup): Results of CT scan pending    CT scan shows increased swelling with possible abscess development, patient has no airway compromise, prescription for antibiotics prescribed by Dr. Ramu Richardson, patient informed of findings and plan, provided with resources for follow-up with dentist, advised to return if any additional concerns                                     Procedures  MDM        Disposition  Final diagnoses:   Facial cellulitis   Dental abscess     Time reflects when diagnosis was documented in both MDM as applicable and the Disposition within this note     Time User Action Codes Description Comment    9/23/2023 12:01 AM More Wilde [T69.718] Facial cellulitis     9/23/2023 12:01 AM More Wilde [K04.7] Dental abscess       ED Disposition     ED Disposition   Discharge    Condition   Stable    Date/Time   Sat Sep 23, 2023  1:04 AM    Comment   Dustin Reyes discharge to home/self care.                Follow-up Information    None       Discharge Medication List as of 9/23/2023 12:05 AM      START taking these medications    Details   amoxicillin-clavulanate (AUGMENTIN) 875-125 mg per tablet Take 1 tablet by mouth every 12 (twelve) hours for 7 days, Starting Sat 9/23/2023, Until Sat 9/30/2023, Normal         CONTINUE these medications which have NOT CHANGED    Details   amoxicillin (AMOXIL) 875 mg tablet Take 875 mg by mouth 2 (two) times a day, Historical Med      furosemide (LASIX) 20 mg tablet Take 20 mg by mouth daily as needed, Starting Thu 7/6/2023, Historical Med      gabapentin (Neurontin) 300 mg capsule Take 1 capsule (300 mg total) by mouth 3 (three) times a day for 14 days For post-herpetic neuralgia: Take 1 tablet on day 1,  Then take 2 tablets on day 2, Then take 3 tablets on day 3 and every day after that as instructed by your doctor., Starting Sat  4/22/2023, Until Thu 9/21/2023, Normal      ketorolac (TORADOL) 10 mg tablet Take 1 tablet (10 mg total) by mouth every 6 (six) hours as needed for moderate pain for up to 20 doses, Starting Thu 9/21/2023, Normal      pantoprazole (PROTONIX) 40 mg tablet TAKE 1 TABLET BY MOUTH EVERY DAY, Normal           No discharge procedures on file.        ED Provider  Electronically Signed by     Whitney Solorzano DO  09/23/23 0103

## 2023-09-23 NOTE — ED PROVIDER NOTES
History  Chief Complaint   Patient presents with   • Facial Swelling     Seen here yesterday for a dental abscess. Has been on amoxil for 3 days. CT scan was completed. Now is very swollen. History provided by:  Patient and medical records     75-year-old female. Presents with worsening left facial swelling/pain. Was evaluated in the ED yesterday. CT imaging was obtained which showed a small dental abscess. No signs of soft tissue infection. The patient has been having worsening left-sided dental pain over the last few days. Currently on a course of amoxicillin. Has taken 6 doses. Pain has been minimally improved with oral Toradol. Over the last 24 hours, the patient states that her pain/swelling has worsened. Denies drooling, inability to control her secretions, fever/chills. Prior to Admission Medications   Prescriptions Last Dose Informant Patient Reported? Taking?   amoxicillin (AMOXIL) 875 mg tablet   Yes No   Sig: Take 875 mg by mouth 2 (two) times a day   furosemide (LASIX) 20 mg tablet   Yes No   Sig: Take 20 mg by mouth daily as needed   gabapentin (Neurontin) 300 mg capsule   No No   Sig: Take 1 capsule (300 mg total) by mouth 3 (three) times a day for 14 days For post-herpetic neuralgia: Take 1 tablet on day 1,  Then take 2 tablets on day 2, Then take 3 tablets on day 3 and every day after that as instructed by your doctor.    ketorolac (TORADOL) 10 mg tablet   No No   Sig: Take 1 tablet (10 mg total) by mouth every 6 (six) hours as needed for moderate pain for up to 20 doses   pantoprazole (PROTONIX) 40 mg tablet   No No   Sig: TAKE 1 TABLET BY MOUTH EVERY DAY      Facility-Administered Medications: None       Past Medical History:   Diagnosis Date   • Arthritis    • GERD (gastroesophageal reflux disease)        Past Surgical History:   Procedure Laterality Date   • HYSTERECTOMY      partial hysterectomy age-43   • TONSILLECTOMY         Family History   Problem Relation Age of Onset • Diabetes Mother    • Heart disease Father    • No Known Problems Sister    • No Known Problems Daughter    • No Known Problems Maternal Grandmother    • No Known Problems Maternal Grandfather    • No Known Problems Paternal Grandmother    • No Known Problems Paternal Grandfather    • Ovarian cancer Daughter    • No Known Problems Maternal Aunt    • Ovarian cancer Maternal Aunt    • Cancer Maternal Aunt    • No Known Problems Maternal Aunt    • No Known Problems Maternal Aunt    • No Known Problems Maternal Aunt    • No Known Problems Maternal Aunt    • Breast cancer Paternal Aunt    • No Known Problems Paternal Aunt    • Breast cancer additional onset Neg Hx    • Colon cancer Neg Hx    • Endometrial cancer Neg Hx    • BRCA 1/2 Neg Hx    • BRCA1 Negative Neg Hx    • BRCA1 Positive Neg Hx    • BRCA2 Negative Neg Hx    • BRCA2 Positive Neg Hx      I have reviewed and agree with the history as documented. E-Cigarette/Vaping   • E-Cigarette Use Never User      E-Cigarette/Vaping Substances   • Nicotine No    • THC No    • CBD No    • Flavoring No    • Other No    • Unknown No      Social History     Tobacco Use   • Smoking status: Never   • Smokeless tobacco: Never   Vaping Use   • Vaping Use: Never used   Substance Use Topics   • Alcohol use: Not Currently   • Drug use: Not Currently     Review of Systems   Constitutional: Positive for fatigue. Negative for activity change, appetite change, chills and fever. HENT: Positive for dental problem and facial swelling. Negative for congestion, drooling, rhinorrhea, sinus pressure, sinus pain, sore throat, trouble swallowing and voice change. Musculoskeletal: Positive for neck pain. Negative for neck stiffness. Skin: Negative for color change, pallor, rash and wound. Neurological: Negative for dizziness, syncope, weakness, light-headedness and headaches. All other systems reviewed and are negative.     Physical Exam  Physical Exam  Vitals and nursing note reviewed. Constitutional:       General: She is not in acute distress. Appearance: She is not ill-appearing or toxic-appearing. HENT:      Head: Atraumatic. Jaw: Trismus, tenderness and swelling present. Comments: Pronounced left-sided facial swelling with tenderness to palpation along the left mandible. Mild erythematous changes. No induration along the soft tissue. There is mild tenderness along the submandibular region. Mild trismus. Mouth/Throat:      Mouth: Mucous membranes are moist.      Pharynx: Oropharynx is clear. No oropharyngeal exudate or posterior oropharyngeal erythema. Comments: Tenderness to palpation along the left lower buccal/alveolar mucosa. No palpable area of fluctuance. No tenderness to palpation along the floor of the mouth. Eyes:      General: No scleral icterus. Right eye: No discharge. Left eye: No discharge. Extraocular Movements: Extraocular movements intact. Conjunctiva/sclera: Conjunctivae normal.   Cardiovascular:      Rate and Rhythm: Normal rate and regular rhythm. Pulses: Normal pulses. Heart sounds: Normal heart sounds. No murmur heard. Pulmonary:      Effort: Pulmonary effort is normal. No respiratory distress. Breath sounds: Normal breath sounds. No stridor. No wheezing, rhonchi or rales. Chest:      Chest wall: No tenderness. Abdominal:      General: Bowel sounds are normal.      Palpations: Abdomen is soft. Tenderness: There is no abdominal tenderness. There is no right CVA tenderness, left CVA tenderness, guarding or rebound. Musculoskeletal:         General: Swelling and tenderness present. Cervical back: Tenderness present. Skin:     General: Skin is warm and dry. Capillary Refill: Capillary refill takes less than 2 seconds. Coloration: Skin is not jaundiced or pale. Findings: Erythema present. No bruising, lesion or rash.    Neurological:      General: No focal deficit present. Mental Status: She is alert and oriented to person, place, and time. Psychiatric:         Mood and Affect: Mood normal.         Behavior: Behavior normal.         Thought Content:  Thought content normal.         Judgment: Judgment normal.       Vital Signs  ED Triage Vitals [09/22/23 2126]   Temperature Pulse Respirations Blood Pressure SpO2   97.7 °F (36.5 °C) 85 18 136/69 95 %      Temp Source Heart Rate Source Patient Position - Orthostatic VS BP Location FiO2 (%)   Temporal -- -- -- --      Pain Score       7         Vitals:    09/22/23 2126   BP: 136/69   Pulse: 85     ED Medications  Medications   ketorolac (TORADOL) injection 15 mg (15 mg Intravenous Given 9/22/23 2158)   iohexol (OMNIPAQUE) 350 MG/ML injection (SINGLE-DOSE) 85 mL (85 mL Intravenous Given 9/22/23 2247)   ampicillin-sulbactam (UNASYN) 3 g in sodium chloride 0.9 % 100 mL IVPB (3 g Intravenous New Bag 9/22/23 2324)     Diagnostic Studies  Results Reviewed     Procedure Component Value Units Date/Time    Basic metabolic panel [[id number]]  (Abnormal) Collected: 09/22/23 2151    Lab Status: Final result Specimen: Blood from Arm, Right Updated: 09/22/23 2226     Sodium 135 mmol/L      Potassium 4.4 mmol/L      Chloride 101 mmol/L      CO2 28 mmol/L      ANION GAP 6 mmol/L      BUN 29 mg/dL      Creatinine 0.80 mg/dL      Glucose 91 mg/dL      Calcium 9.6 mg/dL      eGFR 76 ml/min/1.73sq m     Narrative:      [place] guidelines for Chronic Kidney Disease (CKD):   •  Stage 1 with normal or high GFR (GFR > 90 mL/min/1.73 square meters)  •  Stage 2 Mild CKD (GFR = 60-89 mL/min/1.73 square meters)  •  Stage 3A Moderate CKD (GFR = 45-59 mL/min/1.73 square meters)  •  Stage 3B Moderate CKD (GFR = 30-44 mL/min/1.73 square meters)  •  Stage 4 Severe CKD (GFR = 15-29 mL/min/1.73 square meters)  •  Stage 5 End Stage CKD (GFR <15 mL/min/1.73 square meters)  Note: GFR calculation is accurate only with a steady state creatinine    C-reactive protein [[id number]]  (Abnormal) Collected: 09/22/23 2151    Lab Status: Final result Specimen: Blood from Arm, Right Updated: 09/22/23 2226     CRP 67.2 mg/L     CBC and differential [[id number]]  (Abnormal) Collected: 09/22/23 2151    Lab Status: Final result Specimen: Blood from Arm, Right Updated: 09/22/23 2220     WBC 7.87 Thousand/uL      RBC 4.31 Million/uL      Hemoglobin 12.4 g/dL      Hematocrit 37.8 %      MCV 88 fL      MCH 28.8 pg      MCHC 32.8 g/dL      RDW 13.9 %      MPV 10.0 fL      Platelets 265 Thousands/uL      nRBC 0 /100 WBCs      Neutrophils Relative 48 %      Immat GRANS % 0 %      Lymphocytes Relative 37 %      Monocytes Relative 13 %      Eosinophils Relative 1 %      Basophils Relative 1 %      Neutrophils Absolute 3.89 Thousands/µL      Immature Grans Absolute 0.03 Thousand/uL      Lymphocytes Absolute 2.87 Thousands/µL      Monocytes Absolute 1.00 Thousand/µL      Eosinophils Absolute 0.04 Thousand/µL      Basophils Absolute 0.04 Thousands/µL     Sedimentation rate, automated [[id number]]  (Normal) Collected: 09/22/23 2151    Lab Status: Final result Specimen: Blood from Arm, Right Updated: 09/22/23 2218     Sed Rate 13 mm/hour     Blood culture #1 [[id number]] Collected: 09/22/23 2151    Lab Status: In process Specimen: Blood from Arm, Right Updated: 09/22/23 2203    Blood culture #2 [[id number]] Collected: 09/22/23 2156    Lab Status: In process Specimen: Blood from Arm, Left Updated: 09/22/23 2202             CT soft tissue neck with contrast    (Results Pending)          Procedures  Procedures       ED Course  ED Course as of 09/22/23 2358   Fri Sep 22, 2023   2218 Sed rate is within normal limits. 2221 No leukocytosis. Hemoglobin is within normal limits. Normal platelet count. 2227 Elevated CRP. No significant abnormalities noted on BMP. Renal function is within normal limits. No significant electrolyte abnormalities.      SBIRT 20yo+    Flowsheet Row Most Recent Value   Initial Alcohol Screen: US AUDIT-C     1. How often do you have a drink containing alcohol? 0 Filed at: 09/22/2023 2129   2. How many drinks containing alcohol do you have on a typical day you are drinking? 0 Filed at: 09/22/2023 2129   3a. Male UNDER 65: How often do you have five or more drinks on one occasion? 0 Filed at: 09/22/2023 2129   3b. FEMALE Any Age, or MALE 65+: How often do you have 4 or more drinks on one occassion? 0 Filed at: 09/22/2023 2129   Audit-C Score 0 Filed at: 09/22/2023 2129   SUZIE: How many times in the past year have you. .. Used an illegal drug or used a prescription medication for non-medical reasons? Never Filed at: 09/22/2023 2129          Medical Decision Making  The differential diagnoses include but are not limited to periapical abscess, facial cellulitis, [place]'s angina  Vital signs reviewed. Able to tolerate her secretions. Denies fevers, chills. Worsening facial swelling over the last 24 hours. No palpable areas of fluctuance. IV Unasyn administered. Elevated CRP--no other significant laboratory abnormalities. CT imaging was pending at the time of sign out. The case was signed out to Dr. Leland Moreland. Plan discussed. The patient was stable while under my care. Dental abscess: acute illness or injury  Facial cellulitis: acute illness or injury  Amount and/or Complexity of Data Reviewed  Labs: ordered. Decision-making details documented in ED Course. Radiology: ordered. Risk  Prescription drug management.           Disposition  Final diagnoses:   Facial cellulitis   Dental abscess     Time reflects when diagnosis was documented in both MDM as applicable and the Disposition within this note     Time User Action Codes Description Comment    9/23/2023 12:01 AM Alhaji Wilde [E35.664] Facial cellulitis     9/23/2023 12:01 AM Alhaji Wilde [K04.7] Dental abscess       ED Disposition     ED Disposition   Discharge    Condition   Stable Date/Time   Sat Sep 23, 2023  1:04 AM    Comment   Dustin Reyes discharge to home/self care. Follow-up Information    None         Patient's Medications   Discharge Prescriptions    No medications on file       No discharge procedures on file.     PDMP Review     None          ED Provider  Electronically Signed by           Lauren Gu DO  09/23/23 6578

## 2023-09-23 NOTE — DISCHARGE INSTRUCTIONS
You are being prescribed a course of Augmentin. Do not continue the course of Amoxicillin. You can take Profen 600 mg every 6 hours and Tylenol 1000 mg every 6 hours. Here is a list of  dental clinics that may be able to help you. Keep in mind that these clinics do not have to see you or any other patient. Also, these clinics are not connected to the [facility] or [facility] but if they agree to see you as a patient it is easy for them to call  Medical Records Department to have your records faxed to them. [facility]:  [address] [facility]   [address] [facility]   22-85-39-05:   [facility]   [address] [facility], [address]   [phone]     [facility]:  [address]  [place] [address]  [phone]    [address]:  [address] [facility], [address]  [phone]    [address]:  [address] [facility] [address]  ([address] [address]:  [address] [facility], [address]  [phone]    [facility]:  [address], [address]  [phone]    [address]:  [address] [facility]  [phone]    [address] [address]:  [facility] [address]  [phone] [address] [facility]  [address]  [address] [facility], [address] [facility] [address]  [phone]    [address]:  [address] [place] [address] [address] [place] [address]  Associates:  [address] [facility], [address] [place] [address] Drive  [phone]

## 2023-09-28 LAB
BACTERIA BLD CULT: NORMAL
BACTERIA BLD CULT: NORMAL
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
## 2025-07-21 RX ORDER — DULOXETIN HYDROCHLORIDE 20 MG/1
60 CAPSULE, DELAYED RELEASE ORAL DAILY
COMMUNITY
Start: 2025-06-16

## 2025-07-25 ENCOUNTER — OFFICE VISIT (OUTPATIENT)
Age: 70
End: 2025-07-25
Payer: COMMERCIAL

## 2025-07-25 VITALS
WEIGHT: 134 LBS | BODY MASS INDEX: 25.3 KG/M2 | OXYGEN SATURATION: 99 % | DIASTOLIC BLOOD PRESSURE: 78 MMHG | HEIGHT: 61 IN | SYSTOLIC BLOOD PRESSURE: 126 MMHG | TEMPERATURE: 98 F | HEART RATE: 54 BPM

## 2025-07-25 DIAGNOSIS — K21.9 GASTROESOPHAGEAL REFLUX DISEASE WITHOUT ESOPHAGITIS: Primary | ICD-10-CM

## 2025-07-25 DIAGNOSIS — M54.41 CHRONIC RIGHT-SIDED LOW BACK PAIN WITH RIGHT-SIDED SCIATICA: ICD-10-CM

## 2025-07-25 DIAGNOSIS — G47.33 OBSTRUCTIVE SLEEP APNEA SYNDROME: ICD-10-CM

## 2025-07-25 DIAGNOSIS — F41.1 GENERALIZED ANXIETY DISORDER: ICD-10-CM

## 2025-07-25 DIAGNOSIS — G89.29 CHRONIC RIGHT-SIDED LOW BACK PAIN WITH RIGHT-SIDED SCIATICA: ICD-10-CM

## 2025-07-25 PROBLEM — F43.0 STRESS DISORDER, ACUTE: Status: ACTIVE | Noted: 2025-07-25

## 2025-07-25 PROBLEM — F43.0 STRESS DISORDER, ACUTE: Status: RESOLVED | Noted: 2025-07-25 | Resolved: 2025-07-25

## 2025-07-25 PROCEDURE — 99204 OFFICE O/P NEW MOD 45 MIN: CPT | Performed by: FAMILY MEDICINE

## 2025-07-25 RX ORDER — GABAPENTIN 800 MG/1
800 TABLET ORAL
COMMUNITY
Start: 2025-06-13

## 2025-07-25 RX ORDER — PANTOPRAZOLE SODIUM 40 MG/1
40 TABLET, DELAYED RELEASE ORAL DAILY
Qty: 90 TABLET | Refills: 1 | Status: SHIPPED | OUTPATIENT
Start: 2025-07-25

## 2025-07-25 NOTE — ASSESSMENT & PLAN NOTE
Remains symptomatic and is following up with neurology and apparently has pending MRI although I cannot see him on the schedule

## 2025-07-25 NOTE — PROGRESS NOTES
Name: Kelli Lopez      : 1955      MRN: [id number]  Encounter Provider: Marlo Chang MD  Encounter Date: 2025   Encounter department: [facility]    Assessment & Plan  Gastroesophageal reflux disease without esophagitis  Is now symptomatic.  Will restart the Protonix taken daily  Orders:    pantoprazole (PROTONIX) 40 mg tablet; Take 1 tablet (40 mg total) by mouth daily    Obstructive sleep apnea syndrome  Apparently stable, continue nightly CPAP use.  Sometimes does not use it       Chronic right-sided low back pain with right-sided sciatica  Remains symptomatic and is following up with neurology and apparently has pending MRI although I cannot see him on the schedule       Generalized anxiety disorder  Discussed problem.  Much of this centers around her daughter with her eating disorder.  Is on Cymbalta at this time.  Would not add additional medication         Depression Screening and Follow-up Plan: Patient's depression screening was positive with a PHQ-2 score of 6.   Patient assessed for underlying major depression. Brief counseling provided and recommend additional follow-up/re-evaluation next office visit. Patient is on Cymbalta        History of Present Illness     Patient is seen for first office visit.  Patient has history of chronic low back pain, reflux esophagitis, sleep apnea and stress disorder and does have problems with her sleep.  Has been having trouble with some reflux symptoms as has been off the Protonix for a couple weeks      Review of Systems   Respiratory:  Negative for chest tightness and shortness of breath.    Cardiovascular:  Negative for chest pain, palpitations and leg swelling.   Endocrine: Positive for polyuria.   Genitourinary:  Positive for enuresis.   Neurological:  Positive for dizziness. Negative for light-headedness.     Past Medical History[1]  Past Surgical History[2]  Family History[3]  Social  "History[4]  Medications[5]  Allergies   Allergen Reactions    Erythromycin GI Intolerance     Immunization History   Administered Date(s) Administered    COVID-19 MODERNA VACC 0.5 ML IM 03/25/2021, 04/23/2021    H1N1, All Formulations 12/29/2009    Tdap 04/17/2018     Objective   /78 (BP Location: Left arm, Patient Position: Sitting, Cuff Size: Standard)   Pulse (!) 54   Temp 98 °F (36.7 °C)   Ht 5' 1\" (1.549 m)   Wt 60.8 kg (134 lb)   SpO2 99%   BMI 25.32 kg/m²     Physical Exam  Vitals and nursing note reviewed.   Constitutional:       General: She is not in acute distress.     Appearance: She is well-developed.   HENT:      Head: Normocephalic and atraumatic.     Eyes:      Conjunctiva/sclera: Conjunctivae normal.       Cardiovascular:      Rate and Rhythm: Normal rate and regular rhythm.      Heart sounds: No murmur heard.  Pulmonary:      Effort: Pulmonary effort is normal. No respiratory distress.      Breath sounds: Normal breath sounds.   Abdominal:      Palpations: Abdomen is soft.      Tenderness: There is no abdominal tenderness.     Musculoskeletal:         General: No swelling.      Cervical back: Neck supple.     Skin:     General: Skin is warm and dry.      Capillary Refill: Capillary refill takes less than 2 seconds.     Neurological:      Mental Status: She is alert.      Deep Tendon Reflexes: Reflexes normal.     Psychiatric:         Mood and Affect: Mood normal.                [1]   Past Medical History:  Diagnosis Date    Arthritis     GERD (gastroesophageal reflux disease)    [2]   Past Surgical History:  Procedure Laterality Date    HYSTERECTOMY      partial hysterectomy age-43    TONSILLECTOMY     [3]   Family History  Problem Relation Name Age of Onset    Diabetes Mother      Heart disease Father      No Known Problems Sister      No Known Problems Daughter      No Known Problems Maternal Grandmother      No Known Problems Maternal Grandfather      No Known Problems Paternal " Grandmother      No Known Problems Paternal Grandfather      Ovarian cancer Daughter      No Known Problems Maternal Aunt      Ovarian cancer Maternal Aunt Randa     Cancer Maternal Aunt Rachel     No Known Problems Maternal Aunt      No Known Problems Maternal Aunt      No Known Problems Maternal Aunt      No Known Problems Maternal Aunt      Breast cancer Paternal Aunt      No Known Problems Paternal Aunt      Breast cancer additional onset Neg Hx      Colon cancer Neg Hx      Endometrial cancer Neg Hx      BRCA 1/2 Neg Hx      BRCA1 Negative Neg Hx      BRCA1 Positive Neg Hx      BRCA2 Negative Neg Hx      BRCA2 Positive Neg Hx     [4]   Social History  Tobacco Use    Smoking status: Never    Smokeless tobacco: Never   Vaping Use    Vaping status: Never Used   Substance and Sexual Activity    Alcohol use: Not Currently    Drug use: Not Currently    Sexual activity: Not Currently   [5]   Current Outpatient Medications on File Prior to Visit   Medication Sig    DULoxetine (Cymbalta) 20 mg capsule Take 60 mg by mouth daily    gabapentin (NEURONTIN) 800 mg tablet Take 800 mg by mouth daily at bedtime    [DISCONTINUED] pantoprazole (PROTONIX) 40 mg tablet TAKE 1 TABLET BY MOUTH EVERY DAY    amoxicillin (AMOXIL) 875 mg tablet Take 875 mg by mouth 2 (two) times a day (Patient not taking: Reported on 7/25/2025)    ketorolac (TORADOL) 10 mg tablet Take 1 tablet (10 mg total) by mouth every 6 (six) hours as needed for moderate pain for up to 20 doses (Patient not taking: Reported on 7/25/2025)    [DISCONTINUED] furosemide (LASIX) 20 mg tablet Take 20 mg by mouth daily as needed (Patient not taking: Reported on 7/25/2025)      pantoprazole (PROTONIX) 40 mg tablet TAKE 1 TABLET BY MOUTH EVERY DAY    amoxicillin (AMOXIL) 875 mg tablet Take 875 mg by mouth 2 (two) times a day (Patient not taking: Reported on 7/25/2025)    ketorolac (TORADOL) 10 mg tablet Take 1 tablet (10 mg total) by mouth every 6 (six) hours as needed for moderate pain for up to 20 doses (Patient not taking: Reported on 7/25/2025)    [DISCONTINUED] furosemide (LASIX) 20 mg tablet Take 20 mg by mouth daily as needed (Patient not taking: Reported on 7/25/2025)

## 2025-07-25 NOTE — ASSESSMENT & PLAN NOTE
Discussed problem.  Much of this centers around her daughter with her eating disorder.  Is on Cymbalta at this time.  Would not add additional medication

## 2025-07-25 NOTE — ASSESSMENT & PLAN NOTE
Is now symptomatic.  Will restart the Protonix taken daily  Orders:    pantoprazole (PROTONIX) 40 mg tablet; Take 1 tablet (40 mg total) by mouth daily

## 2025-07-25 NOTE — PATIENT INSTRUCTIONS
Will get back on the pantoprazole for the heartburn.  Does use the CPAP nightly.  Apparently is going to be scheduled for an MRI although I do not see that it is already been done.  Patient is going to check back with neurology to see about this.  Try to push daily walking activity and continue follow-up with neurology note does have cardiology exam pending